# Patient Record
Sex: FEMALE | Race: WHITE
[De-identification: names, ages, dates, MRNs, and addresses within clinical notes are randomized per-mention and may not be internally consistent; named-entity substitution may affect disease eponyms.]

---

## 2017-01-06 ENCOUNTER — APPOINTMENT (OUTPATIENT)
Dept: OBGYN | Facility: CLINIC | Age: 47
End: 2017-01-06

## 2017-01-06 VITALS
WEIGHT: 180 LBS | BODY MASS INDEX: 30.73 KG/M2 | DIASTOLIC BLOOD PRESSURE: 80 MMHG | SYSTOLIC BLOOD PRESSURE: 130 MMHG | HEIGHT: 64 IN

## 2017-01-06 DIAGNOSIS — Z87.42 PERSONAL HISTORY OF OTHER DISEASES OF THE FEMALE GENITAL TRACT: ICD-10-CM

## 2017-01-06 DIAGNOSIS — Z01.419 ENCOUNTER FOR GYNECOLOGICAL EXAMINATION (GENERAL) (ROUTINE) W/OUT ABNORMAL FINDINGS: ICD-10-CM

## 2017-01-06 DIAGNOSIS — F17.200 NICOTINE DEPENDENCE, UNSPECIFIED, UNCOMPLICATED: ICD-10-CM

## 2017-01-09 LAB — HPV HIGH+LOW RISK DNA PNL CVX: NEGATIVE

## 2017-01-10 ENCOUNTER — APPOINTMENT (OUTPATIENT)
Dept: ULTRASOUND IMAGING | Facility: CLINIC | Age: 47
End: 2017-01-10

## 2017-01-10 ENCOUNTER — APPOINTMENT (OUTPATIENT)
Dept: MAMMOGRAPHY | Facility: CLINIC | Age: 47
End: 2017-01-10

## 2017-01-10 ENCOUNTER — OUTPATIENT (OUTPATIENT)
Dept: OUTPATIENT SERVICES | Facility: HOSPITAL | Age: 47
LOS: 1 days | End: 2017-01-10
Payer: COMMERCIAL

## 2017-01-10 DIAGNOSIS — Z00.8 ENCOUNTER FOR OTHER GENERAL EXAMINATION: ICD-10-CM

## 2017-01-10 PROCEDURE — 76830 TRANSVAGINAL US NON-OB: CPT

## 2017-01-10 PROCEDURE — 77067 SCR MAMMO BI INCL CAD: CPT

## 2017-01-10 PROCEDURE — 77063 BREAST TOMOSYNTHESIS BI: CPT

## 2017-01-11 LAB — CYTOLOGY CVX/VAG DOC THIN PREP: NORMAL

## 2017-01-12 DIAGNOSIS — R10.2 PELVIC AND PERINEAL PAIN: ICD-10-CM

## 2017-01-18 ENCOUNTER — FORM ENCOUNTER (OUTPATIENT)
Age: 47
End: 2017-01-18

## 2017-01-18 DIAGNOSIS — R92.8 OTHER ABNORMAL AND INCONCLUSIVE FINDINGS ON DIAGNOSTIC IMAGING OF BREAST: ICD-10-CM

## 2017-01-19 ENCOUNTER — APPOINTMENT (OUTPATIENT)
Dept: MAMMOGRAPHY | Facility: CLINIC | Age: 47
End: 2017-01-19

## 2017-01-19 ENCOUNTER — APPOINTMENT (OUTPATIENT)
Dept: ULTRASOUND IMAGING | Facility: CLINIC | Age: 47
End: 2017-01-19

## 2017-01-19 ENCOUNTER — OUTPATIENT (OUTPATIENT)
Dept: OUTPATIENT SERVICES | Facility: HOSPITAL | Age: 47
LOS: 1 days | End: 2017-01-19
Payer: COMMERCIAL

## 2017-01-19 DIAGNOSIS — Z00.8 ENCOUNTER FOR OTHER GENERAL EXAMINATION: ICD-10-CM

## 2017-01-19 PROCEDURE — 77065 DX MAMMO INCL CAD UNI: CPT

## 2017-01-19 PROCEDURE — G0279: CPT

## 2017-06-08 ENCOUNTER — OTHER (OUTPATIENT)
Age: 47
End: 2017-06-08

## 2018-09-17 ENCOUNTER — EMERGENCY (EMERGENCY)
Facility: HOSPITAL | Age: 48
LOS: 0 days | Discharge: HOME | End: 2018-09-17
Attending: EMERGENCY MEDICINE
Payer: COMMERCIAL

## 2018-09-17 VITALS
RESPIRATION RATE: 17 BRPM | OXYGEN SATURATION: 96 % | SYSTOLIC BLOOD PRESSURE: 96 MMHG | WEIGHT: 169.98 LBS | TEMPERATURE: 98 F | HEART RATE: 66 BPM | DIASTOLIC BLOOD PRESSURE: 63 MMHG | HEIGHT: 65 IN

## 2018-09-17 VITALS
RESPIRATION RATE: 18 BRPM | DIASTOLIC BLOOD PRESSURE: 72 MMHG | HEART RATE: 67 BPM | SYSTOLIC BLOOD PRESSURE: 111 MMHG | OXYGEN SATURATION: 97 % | TEMPERATURE: 98 F

## 2018-09-17 DIAGNOSIS — Z79.84 LONG TERM (CURRENT) USE OF ORAL HYPOGLYCEMIC DRUGS: ICD-10-CM
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
## 2018-09-17 DIAGNOSIS — T65.94XA TOXIC EFFECT OF UNSPECIFIED SUBSTANCE, UNDETERMINED, INITIAL ENCOUNTER: ICD-10-CM

## 2018-09-17 DIAGNOSIS — E03.9 HYPOTHYROIDISM, UNSPECIFIED: ICD-10-CM

## 2018-09-17 DIAGNOSIS — T43.211A POISONING BY SELECTIVE SEROTONIN AND NOREPINEPHRINE REUPTAKE INHIBITORS, ACCIDENTAL (UNINTENTIONAL), INITIAL ENCOUNTER: ICD-10-CM

## 2018-09-17 DIAGNOSIS — E11.9 TYPE 2 DIABETES MELLITUS WITHOUT COMPLICATIONS: ICD-10-CM
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
## 2018-09-17 PROCEDURE — 93010 ELECTROCARDIOGRAM REPORT: CPT

## 2018-09-17 PROCEDURE — 99285 EMERGENCY DEPT VISIT HI MDM: CPT | Mod: 25

## 2018-09-17 NOTE — ED ADULT NURSE NOTE - OBJECTIVE STATEMENT
Pt reports that she accidently took 200 mg of her dog's trazodone instead of her metformin this morning, now feels "dopey," Head is atraumatic. Head shape is symmetrical. Pt reports that she accidently took 200 mg of her dog's trazodone instead of her metformin this morning, now feels "dopey."

## 2018-09-17 NOTE — ED PROVIDER NOTE - PROGRESS NOTE DETAILS
discussed with pcc who states obtain ekg and d/c home as the max daily dose of trazodone is 400-600 mg and pt only took 200 mg. pt tolerated po, will dc

## 2018-09-17 NOTE — ED PROVIDER NOTE - OBJECTIVE STATEMENT
49 yo m with dm, hypothyroidism that accidentally took her dogs 200 mg trazadone instead of her metformin at 7 am.  c/o feeling sleepy. no pain. no other coingestions.

## 2018-09-17 NOTE — ED ADULT TRIAGE NOTE - CHIEF COMPLAINT QUOTE
Patient presents with EMS reports she took 200mg of her dogs Trazadone by accident. Patient lethargic at triage but A&OX3.

## 2018-09-17 NOTE — ED PROVIDER NOTE - MEDICAL DECISION MAKING DETAILS
49 yo f with accidental ingestion of her dogs 200 mg tramadol at 7 am this morning.   ekg, discuss with P[facility]

## 2019-05-20 ENCOUNTER — TRANSCRIPTION ENCOUNTER (OUTPATIENT)
Age: 49
End: 2019-05-20

## 2020-05-19 ENCOUNTER — TRANSCRIPTION ENCOUNTER (OUTPATIENT)
Age: 50
End: 2020-05-19

## 2020-11-14 ENCOUNTER — OUTPATIENT (OUTPATIENT)
Dept: OUTPATIENT SERVICES | Facility: HOSPITAL | Age: 50
LOS: 1 days | End: 2020-11-14
Payer: COMMERCIAL

## 2020-11-14 DIAGNOSIS — Z11.59 ENCOUNTER FOR SCREENING FOR OTHER VIRAL DISEASES: ICD-10-CM

## 2020-11-14 LAB — SARS-COV-2 RNA SPEC QL NAA+PROBE: SIGNIFICANT CHANGE UP

## 2020-11-14 PROCEDURE — U0003: CPT

## 2020-11-15 DIAGNOSIS — Z11.59 ENCOUNTER FOR SCREENING FOR OTHER VIRAL DISEASES: ICD-10-CM

## 2021-01-02 ENCOUNTER — OUTPATIENT (OUTPATIENT)
Dept: OUTPATIENT SERVICES | Facility: HOSPITAL | Age: 51
LOS: 1 days | End: 2021-01-02
Payer: COMMERCIAL

## 2021-01-02 DIAGNOSIS — Z20.828 CONTACT WITH AND (SUSPECTED) EXPOSURE TO OTHER VIRAL COMMUNICABLE DISEASES: ICD-10-CM

## 2021-01-02 PROCEDURE — U0003: CPT

## 2021-01-02 PROCEDURE — C9803: CPT

## 2021-01-02 PROCEDURE — U0005: CPT

## 2021-01-03 ENCOUNTER — TRANSCRIPTION ENCOUNTER (OUTPATIENT)
Age: 51
End: 2021-01-03

## 2021-01-03 DIAGNOSIS — Z20.828 CONTACT WITH AND (SUSPECTED) EXPOSURE TO OTHER VIRAL COMMUNICABLE DISEASES: ICD-10-CM

## 2021-01-03 LAB — SARS-COV-2 RNA SPEC QL NAA+PROBE: SIGNIFICANT CHANGE UP

## 2021-06-24 ENCOUNTER — INPATIENT (INPATIENT)
Facility: HOSPITAL | Age: 51
LOS: 5 days | Discharge: HOME | DRG: 193 | End: 2021-06-30
Attending: FAMILY MEDICINE | Admitting: HOSPITALIST
Payer: COMMERCIAL

## 2021-06-24 VITALS
WEIGHT: 169.09 LBS | HEART RATE: 84 BPM | SYSTOLIC BLOOD PRESSURE: 111 MMHG | TEMPERATURE: 100 F | DIASTOLIC BLOOD PRESSURE: 74 MMHG | RESPIRATION RATE: 20 BRPM | OXYGEN SATURATION: 86 % | HEIGHT: 65 IN

## 2021-06-24 DIAGNOSIS — J18.9 PNEUMONIA, UNSPECIFIED ORGANISM: ICD-10-CM

## 2021-06-24 LAB
ALBUMIN SERPL ELPH-MCNC: 3.3 G/DL — SIGNIFICANT CHANGE UP (ref 3.3–5)
ALP SERPL-CCNC: 79 U/L — SIGNIFICANT CHANGE UP (ref 40–120)
ALT FLD-CCNC: 21 U/L — SIGNIFICANT CHANGE UP (ref 12–78)
ANION GAP SERPL CALC-SCNC: 5 MMOL/L — SIGNIFICANT CHANGE UP (ref 5–17)
AST SERPL-CCNC: 23 U/L — SIGNIFICANT CHANGE UP (ref 15–37)
BASOPHILS # BLD AUTO: 0.03 K/UL — SIGNIFICANT CHANGE UP (ref 0–0.2)
BASOPHILS NFR BLD AUTO: 0.2 % — SIGNIFICANT CHANGE UP (ref 0–2)
BILIRUB SERPL-MCNC: 0.2 MG/DL — SIGNIFICANT CHANGE UP (ref 0.2–1.2)
BUN SERPL-MCNC: 10 MG/DL — SIGNIFICANT CHANGE UP (ref 7–23)
CALCIUM SERPL-MCNC: 9.2 MG/DL — SIGNIFICANT CHANGE UP (ref 8.5–10.1)
CHLORIDE SERPL-SCNC: 104 MMOL/L — SIGNIFICANT CHANGE UP (ref 96–108)
CO2 SERPL-SCNC: 27 MMOL/L — SIGNIFICANT CHANGE UP (ref 22–31)
CREAT SERPL-MCNC: 0.54 MG/DL — SIGNIFICANT CHANGE UP (ref 0.5–1.3)
EOSINOPHIL # BLD AUTO: 0.49 K/UL — SIGNIFICANT CHANGE UP (ref 0–0.5)
EOSINOPHIL NFR BLD AUTO: 3.7 % — SIGNIFICANT CHANGE UP (ref 0–6)
GLUCOSE SERPL-MCNC: 110 MG/DL — HIGH (ref 70–99)
HCG SERPL-ACNC: <1 MIU/ML — SIGNIFICANT CHANGE UP
HCT VFR BLD CALC: 37.9 % — SIGNIFICANT CHANGE UP (ref 34.5–45)
HGB BLD-MCNC: 12.5 G/DL — SIGNIFICANT CHANGE UP (ref 11.5–15.5)
IMM GRANULOCYTES NFR BLD AUTO: 0.4 % — SIGNIFICANT CHANGE UP (ref 0–1.5)
LACTATE SERPL-SCNC: 1.3 MMOL/L — SIGNIFICANT CHANGE UP (ref 0.7–2)
LYMPHOCYTES # BLD AUTO: 1.58 K/UL — SIGNIFICANT CHANGE UP (ref 1–3.3)
LYMPHOCYTES # BLD AUTO: 11.8 % — LOW (ref 13–44)
MCHC RBC-ENTMCNC: 32.7 PG — SIGNIFICANT CHANGE UP (ref 27–34)
MCHC RBC-ENTMCNC: 33 GM/DL — SIGNIFICANT CHANGE UP (ref 32–36)
MCV RBC AUTO: 99.2 FL — SIGNIFICANT CHANGE UP (ref 80–100)
MONOCYTES # BLD AUTO: 0.75 K/UL — SIGNIFICANT CHANGE UP (ref 0–0.9)
MONOCYTES NFR BLD AUTO: 5.6 % — SIGNIFICANT CHANGE UP (ref 2–14)
NEUTROPHILS # BLD AUTO: 10.5 K/UL — HIGH (ref 1.8–7.4)
NEUTROPHILS NFR BLD AUTO: 78.3 % — HIGH (ref 43–77)
PLATELET # BLD AUTO: 291 K/UL — SIGNIFICANT CHANGE UP (ref 150–400)
POTASSIUM SERPL-MCNC: 3.7 MMOL/L — SIGNIFICANT CHANGE UP (ref 3.5–5.3)
POTASSIUM SERPL-SCNC: 3.7 MMOL/L — SIGNIFICANT CHANGE UP (ref 3.5–5.3)
PROT SERPL-MCNC: 7.5 GM/DL — SIGNIFICANT CHANGE UP (ref 6–8.3)
RAPID RVP RESULT: SIGNIFICANT CHANGE UP
RBC # BLD: 3.82 M/UL — SIGNIFICANT CHANGE UP (ref 3.8–5.2)
RBC # FLD: 13 % — SIGNIFICANT CHANGE UP (ref 10.3–14.5)
SARS-COV-2 RNA SPEC QL NAA+PROBE: SIGNIFICANT CHANGE UP
SODIUM SERPL-SCNC: 136 MMOL/L — SIGNIFICANT CHANGE UP (ref 135–145)
WBC # BLD: 13.4 K/UL — HIGH (ref 3.8–10.5)
WBC # FLD AUTO: 13.4 K/UL — HIGH (ref 3.8–10.5)

## 2021-06-24 PROCEDURE — 36415 COLL VENOUS BLD VENIPUNCTURE: CPT

## 2021-06-24 PROCEDURE — 83615 LACTATE (LD) (LDH) ENZYME: CPT

## 2021-06-24 PROCEDURE — 71250 CT THORAX DX C-: CPT

## 2021-06-24 PROCEDURE — 85027 COMPLETE CBC AUTOMATED: CPT

## 2021-06-24 PROCEDURE — 93010 ELECTROCARDIOGRAM REPORT: CPT

## 2021-06-24 PROCEDURE — 86140 C-REACTIVE PROTEIN: CPT

## 2021-06-24 PROCEDURE — 99285 EMERGENCY DEPT VISIT HI MDM: CPT

## 2021-06-24 PROCEDURE — 82784 ASSAY IGA/IGD/IGG/IGM EACH: CPT

## 2021-06-24 PROCEDURE — 80076 HEPATIC FUNCTION PANEL: CPT

## 2021-06-24 PROCEDURE — 71046 X-RAY EXAM CHEST 2 VIEWS: CPT

## 2021-06-24 PROCEDURE — 82306 VITAMIN D 25 HYDROXY: CPT

## 2021-06-24 PROCEDURE — 87070 CULTURE OTHR SPECIMN AEROBIC: CPT

## 2021-06-24 PROCEDURE — 85652 RBC SED RATE AUTOMATED: CPT

## 2021-06-24 PROCEDURE — 83735 ASSAY OF MAGNESIUM: CPT

## 2021-06-24 PROCEDURE — 71045 X-RAY EXAM CHEST 1 VIEW: CPT | Mod: 26

## 2021-06-24 PROCEDURE — 84100 ASSAY OF PHOSPHORUS: CPT

## 2021-06-24 PROCEDURE — 80048 BASIC METABOLIC PNL TOTAL CA: CPT

## 2021-06-24 PROCEDURE — 99222 1ST HOSP IP/OBS MODERATE 55: CPT

## 2021-06-24 PROCEDURE — 85025 COMPLETE CBC W/AUTO DIFF WBC: CPT

## 2021-06-24 PROCEDURE — 84443 ASSAY THYROID STIM HORMONE: CPT

## 2021-06-24 PROCEDURE — 80053 COMPREHEN METABOLIC PANEL: CPT

## 2021-06-24 PROCEDURE — 94640 AIRWAY INHALATION TREATMENT: CPT

## 2021-06-24 PROCEDURE — 94760 N-INVAS EAR/PLS OXIMETRY 1: CPT

## 2021-06-24 PROCEDURE — 86769 SARS-COV-2 COVID-19 ANTIBODY: CPT

## 2021-06-24 RX ORDER — AZITHROMYCIN 500 MG/1
500 TABLET, FILM COATED ORAL ONCE
Refills: 0 | Status: COMPLETED | OUTPATIENT
Start: 2021-06-24 | End: 2021-06-24

## 2021-06-24 RX ORDER — ALPRAZOLAM 0.25 MG
1 TABLET ORAL
Refills: 0 | Status: DISCONTINUED | OUTPATIENT
Start: 2021-06-24 | End: 2021-06-30

## 2021-06-24 RX ORDER — ACETAMINOPHEN 500 MG
650 TABLET ORAL EVERY 6 HOURS
Refills: 0 | Status: DISCONTINUED | OUTPATIENT
Start: 2021-06-24 | End: 2021-06-30

## 2021-06-24 RX ORDER — ALBUTEROL 90 UG/1
2 AEROSOL, METERED ORAL EVERY 6 HOURS
Refills: 0 | Status: DISCONTINUED | OUTPATIENT
Start: 2021-06-24 | End: 2021-06-24

## 2021-06-24 RX ORDER — CEFTRIAXONE 500 MG/1
1000 INJECTION, POWDER, FOR SOLUTION INTRAMUSCULAR; INTRAVENOUS EVERY 24 HOURS
Refills: 0 | Status: DISCONTINUED | OUTPATIENT
Start: 2021-06-24 | End: 2021-06-26

## 2021-06-24 RX ORDER — LOSARTAN POTASSIUM 100 MG/1
50 TABLET, FILM COATED ORAL DAILY
Refills: 0 | Status: DISCONTINUED | OUTPATIENT
Start: 2021-06-24 | End: 2021-06-30

## 2021-06-24 RX ORDER — ALBUTEROL 90 UG/1
6 AEROSOL, METERED ORAL ONCE
Refills: 0 | Status: COMPLETED | OUTPATIENT
Start: 2021-06-24 | End: 2021-06-24

## 2021-06-24 RX ORDER — VENLAFAXINE HCL 75 MG
37.5 CAPSULE, EXT RELEASE 24 HR ORAL DAILY
Refills: 0 | Status: DISCONTINUED | OUTPATIENT
Start: 2021-06-24 | End: 2021-06-25

## 2021-06-24 RX ORDER — SODIUM CHLORIDE 9 MG/ML
500 INJECTION INTRAMUSCULAR; INTRAVENOUS; SUBCUTANEOUS
Refills: 0 | Status: DISCONTINUED | OUTPATIENT
Start: 2021-06-24 | End: 2021-06-30

## 2021-06-24 RX ORDER — ACETAMINOPHEN 500 MG
650 TABLET ORAL ONCE
Refills: 0 | Status: COMPLETED | OUTPATIENT
Start: 2021-06-24 | End: 2021-06-26

## 2021-06-24 RX ORDER — TIOTROPIUM BROMIDE 18 UG/1
1 CAPSULE ORAL; RESPIRATORY (INHALATION) ONCE
Refills: 0 | Status: COMPLETED | OUTPATIENT
Start: 2021-06-24 | End: 2021-06-24

## 2021-06-24 RX ORDER — SODIUM CHLORIDE 9 MG/ML
1000 INJECTION INTRAMUSCULAR; INTRAVENOUS; SUBCUTANEOUS ONCE
Refills: 0 | Status: COMPLETED | OUTPATIENT
Start: 2021-06-24 | End: 2021-06-24

## 2021-06-24 RX ORDER — VENLAFAXINE HCL 75 MG
150 CAPSULE, EXT RELEASE 24 HR ORAL DAILY
Refills: 0 | Status: DISCONTINUED | OUTPATIENT
Start: 2021-06-24 | End: 2021-06-26

## 2021-06-24 RX ORDER — AZITHROMYCIN 500 MG/1
500 TABLET, FILM COATED ORAL EVERY 24 HOURS
Refills: 0 | Status: DISCONTINUED | OUTPATIENT
Start: 2021-06-24 | End: 2021-06-26

## 2021-06-24 RX ORDER — NICOTINE POLACRILEX 2 MG
1 GUM BUCCAL DAILY
Refills: 0 | Status: DISCONTINUED | OUTPATIENT
Start: 2021-06-24 | End: 2021-06-30

## 2021-06-24 RX ORDER — CEFTRIAXONE 500 MG/1
1000 INJECTION, POWDER, FOR SOLUTION INTRAMUSCULAR; INTRAVENOUS ONCE
Refills: 0 | Status: COMPLETED | OUTPATIENT
Start: 2021-06-24 | End: 2021-06-24

## 2021-06-24 RX ORDER — LEVOTHYROXINE SODIUM 125 MCG
112 TABLET ORAL DAILY
Refills: 0 | Status: DISCONTINUED | OUTPATIENT
Start: 2021-06-24 | End: 2021-06-30

## 2021-06-24 RX ORDER — ALBUTEROL 90 UG/1
2 AEROSOL, METERED ORAL EVERY 6 HOURS
Refills: 0 | Status: DISCONTINUED | OUTPATIENT
Start: 2021-06-24 | End: 2021-06-30

## 2021-06-24 RX ORDER — HYDROCHLOROTHIAZIDE 25 MG
12.5 TABLET ORAL DAILY
Refills: 0 | Status: DISCONTINUED | OUTPATIENT
Start: 2021-06-24 | End: 2021-06-30

## 2021-06-24 RX ADMIN — Medication 650 MILLIGRAM(S): at 22:06

## 2021-06-24 RX ADMIN — Medication 650 MILLIGRAM(S): at 21:36

## 2021-06-24 RX ADMIN — AZITHROMYCIN 255 MILLIGRAM(S): 500 TABLET, FILM COATED ORAL at 18:38

## 2021-06-24 RX ADMIN — SODIUM CHLORIDE 125 MILLILITER(S): 9 INJECTION INTRAMUSCULAR; INTRAVENOUS; SUBCUTANEOUS at 21:37

## 2021-06-24 RX ADMIN — Medication 1 MILLIGRAM(S): at 21:36

## 2021-06-24 RX ADMIN — ALBUTEROL 6 PUFF(S): 90 AEROSOL, METERED ORAL at 18:38

## 2021-06-24 RX ADMIN — SODIUM CHLORIDE 1000 MILLILITER(S): 9 INJECTION INTRAMUSCULAR; INTRAVENOUS; SUBCUTANEOUS at 18:39

## 2021-06-24 RX ADMIN — CEFTRIAXONE 1000 MILLIGRAM(S): 500 INJECTION, POWDER, FOR SOLUTION INTRAMUSCULAR; INTRAVENOUS at 18:38

## 2021-06-24 RX ADMIN — TIOTROPIUM BROMIDE 1 CAPSULE(S): 18 CAPSULE ORAL; RESPIRATORY (INHALATION) at 18:13

## 2021-06-24 NOTE — H&P ADULT - HISTORY OF PRESENT ILLNESS
49 y/o F with PMHx of recurrent pna, HTN, anxiety, presents to the ED c/o progressively worsening sob and productive cough w/ dark green sputum x 4 days. Pt also endorses associated HA and low grade fever that are alleviated w/ ASA. Pt reports she vomited throughout Sunday night and has been only eating soup since then. Denies chest discomfort, body aches, loss of smell or taste. Pt is currently a smoker. Oxygen level upon ED arrival was 86% ORA. Pt has had recurrent pna since she was 19, last episode x1.5 years ago. Pt last saw her pulmonologist Dr. Trotter ([place]) x1.5 years ago. No recent travel. Pt reports her son recently had a cold on antibiotics, had a negative COVID test. NKDA. Pt is COVID vaccinated. Pt does not follow w/ a PCP, [facility]. 51 y/o Female with PMHx of recurrent pna, HTN, COPD, anxiety, presents to the ED with complain of progressively worsening sob and productive cough with dark green sputum x 4 days. She denies any fever at home. Denies chest discomfort, body aches, loss of smell or taste.Oxygen level upon ED arrival was 86% ORA. Patient has had recurrent pna since she was 19, last episode x1.5 years ago. Patient last saw her pulmonologist Dr. Trotter ([place]) x1.5 years ago. No recent travel. Patient reports her son recently had a cold on antibiotics, had a negative COVID test. NKDA. Patient is COVID vaccinated. No other complain.

## 2021-06-24 NOTE — ED ADULT NURSE REASSESSMENT NOTE - NS ED NURSE REASSESS COMMENT FT1
pt resting comfortably, vitals stable, in no distress, medications given as ordered, aware of plan of care

## 2021-06-24 NOTE — ED PROVIDER NOTE - MUSCULOSKELETAL, MLM
Spine appears normal, range of motion is not limited, no muscle or joint tenderness. MAEx4, no focal swelling or tenderness.

## 2021-06-24 NOTE — H&P ADULT - ASSESSMENT
A/P:    1.  Community Acquired Pneumonia  -started on IV Ceftriaxone and ZIthromax  -follow cultures  -give Oxygen as needed    2.  h/o COPD  -stable  continue Albuterol inhaler     3.  SCD for DVT ppx    4.  Code status: Patient is in Full code status.     5.  Tobacco abuse  -advised to quit smoking  -on Nicotine patch

## 2021-06-24 NOTE — ED ADULT NURSE NOTE - OBJECTIVE STATEMENT
pt presents to ed ambulatory for evaluation of SOB- pt was seen at urgent care this morning and dxed with PNA and sent home with abx. pt reports feeling worse and coming to ed. , in ed, pt oxygen 88-90% on room air, placed on 2L with good improvement to 94-95%. pt resp in 20s. a&ox4, good color, in no distress. bp normotensive. pt daily smoker. received both covid vaccines. reports sick contact at home

## 2021-06-24 NOTE — ED ADULT NURSE NOTE - CHIEF COMPLAINT QUOTE
pt c/o sobx 4 days, hx of recurrent PNA, pt states her son was sick at home, oxygen 86% RA.  pt currently a smoker, smoked for nearly 40years.  No resp distress noted.  no known hx of COPD.  pt sent to main.  Requested EKG.  pt already received 2 doses of covid vaccine.

## 2021-06-24 NOTE — ED PROVIDER NOTE - OBJECTIVE STATEMENT
51 y/o F with PMHx of recurrent pna, HTN, anxiety, presents to the ED c/o progressively worsening sob and productive cough w/ dark green sputum x 4 days. Pt also endorses associated HA and low grade fever that are alleviated w/ ASA. Pt reports she vomited throughout Sunday night and has been only eating soup since then. Denies chest discomfort, body aches, loss of smell or taste. Pt is currently a smoker. Oxygen level upon ED arrival was 86% ORA. Pt has had recurrent pna since she was 19, last episode x1.5 years ago. Pt last saw her pulmonologist Dr. Trotter ([place]) x1.5 years ago. No recent travel. Pt reports her son recently had a cold on antibiotics, had a negative COVID test. NKDA. Pt is COVID vaccinated. Pt does not follow w/ a PCP, goes to [facility].

## 2021-06-24 NOTE — ED PROVIDER NOTE - CLINICAL SUMMARY MEDICAL DECISION MAKING FREE TEXT BOX
51 y/o F active smoker no previous diagnosis COPD, positive pneumonia pre- COVID, positive COVID vaccinations, ambulatory to ED w/ SOB and cough x4 days, subjective fever, positive hypoxic at Triage, 86 % on room air, positive LLL crackles on exam. Plan: EKG, CXR,  labs including,  blood culture, coags, preg test, COVID swab, O2 supplementations, albuterol, IV fluids, IV antibiotics, admit.

## 2021-06-24 NOTE — ED ADULT TRIAGE NOTE - CHIEF COMPLAINT QUOTE
pt c/o sobx 4 days, hx of recurrent PNA, pt states her son was sick at home, oxygen 86% RA.  pt currently a smoker, smoked for nearly 40years.  No resp distress noted.  no known hx of COPD.  pt sent to main.  Requested EKG pt c/o sobx 4 days, hx of recurrent PNA, pt states her son was sick at home, oxygen 86% RA.  pt currently a smoker, smoked for nearly 40years.  No resp distress noted.  no known hx of COPD.  pt sent to main.  Requested EKG.  pt already received 2 doses of covid vaccine.

## 2021-06-24 NOTE — ED PROVIDER NOTE - ENMT, MLM
Airway patent, Nasal mucosa clear. Mouth with normal mucosa. Throat has no vesicles, no oropharyngeal exudates and uvula is midline.  Oropharynx clear, MM mildly dry.

## 2021-06-24 NOTE — H&P ADULT - NSHPPHYSICALEXAM_GEN_ALL_CORE
Vital Signs Last 24 Hrs  T(C): 37.1 (24 Jun 2021 20:46), Max: 37.8 (24 Jun 2021 16:45)  T(F): 98.7 (24 Jun 2021 20:46), Max: 100.1 (24 Jun 2021 16:45)  HR: 82 (24 Jun 2021 20:46) (82 - 85)  BP: 116/74 (24 Jun 2021 20:46) (111/74 - 120/79)  RR: 18 (24 Jun 2021 20:46) (18 - 22)  SpO2: 96% (24 Jun 2021 20:46) (86% - 96%)

## 2021-06-25 LAB
ALBUMIN SERPL ELPH-MCNC: 2.9 G/DL — LOW (ref 3.3–5)
ALP SERPL-CCNC: 84 U/L — SIGNIFICANT CHANGE UP (ref 40–120)
ALT FLD-CCNC: 20 U/L — SIGNIFICANT CHANGE UP (ref 12–78)
ANION GAP SERPL CALC-SCNC: 3 MMOL/L — LOW (ref 5–17)
ANION GAP SERPL CALC-SCNC: 7 MMOL/L — SIGNIFICANT CHANGE UP (ref 5–17)
AST SERPL-CCNC: 24 U/L — SIGNIFICANT CHANGE UP (ref 15–37)
BILIRUB DIRECT SERPL-MCNC: <0.1 MG/DL — SIGNIFICANT CHANGE UP (ref 0–0.2)
BILIRUB INDIRECT FLD-MCNC: >0.2 MG/DL — SIGNIFICANT CHANGE UP (ref 0.2–1)
BILIRUB SERPL-MCNC: 0.3 MG/DL — SIGNIFICANT CHANGE UP (ref 0.2–1.2)
BUN SERPL-MCNC: 6 MG/DL — LOW (ref 7–23)
BUN SERPL-MCNC: 8 MG/DL — SIGNIFICANT CHANGE UP (ref 7–23)
CALCIUM SERPL-MCNC: 9.2 MG/DL — SIGNIFICANT CHANGE UP (ref 8.5–10.1)
CALCIUM SERPL-MCNC: 9.2 MG/DL — SIGNIFICANT CHANGE UP (ref 8.5–10.1)
CHLORIDE SERPL-SCNC: 105 MMOL/L — SIGNIFICANT CHANGE UP (ref 96–108)
CHLORIDE SERPL-SCNC: 105 MMOL/L — SIGNIFICANT CHANGE UP (ref 96–108)
CO2 SERPL-SCNC: 27 MMOL/L — SIGNIFICANT CHANGE UP (ref 22–31)
CO2 SERPL-SCNC: 30 MMOL/L — SIGNIFICANT CHANGE UP (ref 22–31)
COVID-19 SPIKE DOMAIN AB INTERP: POSITIVE
COVID-19 SPIKE DOMAIN ANTIBODY RESULT: 137 U/ML — HIGH
CREAT SERPL-MCNC: 0.42 MG/DL — LOW (ref 0.5–1.3)
CREAT SERPL-MCNC: 0.44 MG/DL — LOW (ref 0.5–1.3)
GLUCOSE SERPL-MCNC: 104 MG/DL — HIGH (ref 70–99)
GLUCOSE SERPL-MCNC: 107 MG/DL — HIGH (ref 70–99)
HCT VFR BLD CALC: 38 % — SIGNIFICANT CHANGE UP (ref 34.5–45)
HGB BLD-MCNC: 12.4 G/DL — SIGNIFICANT CHANGE UP (ref 11.5–15.5)
MAGNESIUM SERPL-MCNC: 2 MG/DL — SIGNIFICANT CHANGE UP (ref 1.6–2.6)
MCHC RBC-ENTMCNC: 32.6 GM/DL — SIGNIFICANT CHANGE UP (ref 32–36)
MCHC RBC-ENTMCNC: 32.7 PG — SIGNIFICANT CHANGE UP (ref 27–34)
MCV RBC AUTO: 100.3 FL — HIGH (ref 80–100)
PHOSPHATE SERPL-MCNC: 3.3 MG/DL — SIGNIFICANT CHANGE UP (ref 2.5–4.5)
PLATELET # BLD AUTO: 290 K/UL — SIGNIFICANT CHANGE UP (ref 150–400)
POTASSIUM SERPL-MCNC: 3.8 MMOL/L — SIGNIFICANT CHANGE UP (ref 3.5–5.3)
POTASSIUM SERPL-MCNC: 4 MMOL/L — SIGNIFICANT CHANGE UP (ref 3.5–5.3)
POTASSIUM SERPL-SCNC: 3.8 MMOL/L — SIGNIFICANT CHANGE UP (ref 3.5–5.3)
POTASSIUM SERPL-SCNC: 4 MMOL/L — SIGNIFICANT CHANGE UP (ref 3.5–5.3)
PROT SERPL-MCNC: 7.2 GM/DL — SIGNIFICANT CHANGE UP (ref 6–8.3)
RBC # BLD: 3.79 M/UL — LOW (ref 3.8–5.2)
RBC # FLD: 12.9 % — SIGNIFICANT CHANGE UP (ref 10.3–14.5)
SARS-COV-2 IGG+IGM SERPL QL IA: 137 U/ML — HIGH
SARS-COV-2 IGG+IGM SERPL QL IA: POSITIVE
SODIUM SERPL-SCNC: 138 MMOL/L — SIGNIFICANT CHANGE UP (ref 135–145)
SODIUM SERPL-SCNC: 139 MMOL/L — SIGNIFICANT CHANGE UP (ref 135–145)
WBC # BLD: 13.33 K/UL — HIGH (ref 3.8–10.5)
WBC # FLD AUTO: 13.33 K/UL — HIGH (ref 3.8–10.5)

## 2021-06-25 PROCEDURE — 99233 SBSQ HOSP IP/OBS HIGH 50: CPT

## 2021-06-25 RX ORDER — VENLAFAXINE HCL 75 MG
225 CAPSULE, EXT RELEASE 24 HR ORAL DAILY
Refills: 0 | Status: DISCONTINUED | OUTPATIENT
Start: 2021-06-25 | End: 2021-06-30

## 2021-06-25 RX ORDER — BUDESONIDE AND FORMOTEROL FUMARATE DIHYDRATE 160; 4.5 UG/1; UG/1
2 AEROSOL RESPIRATORY (INHALATION)
Refills: 0 | Status: DISCONTINUED | OUTPATIENT
Start: 2021-06-25 | End: 2021-06-30

## 2021-06-25 RX ORDER — METFORMIN HYDROCHLORIDE 850 MG/1
4 TABLET ORAL
Qty: 0 | Refills: 0 | DISCHARGE

## 2021-06-25 RX ORDER — VENLAFAXINE HCL 75 MG
1 CAPSULE, EXT RELEASE 24 HR ORAL
Qty: 0 | Refills: 0 | DISCHARGE

## 2021-06-25 RX ORDER — ALBUTEROL 90 UG/1
2 AEROSOL, METERED ORAL
Qty: 0 | Refills: 0 | DISCHARGE

## 2021-06-25 RX ORDER — LEVOTHYROXINE SODIUM 125 MCG
1 TABLET ORAL
Qty: 0 | Refills: 0 | DISCHARGE

## 2021-06-25 RX ORDER — LOSARTAN POTASSIUM 100 MG/1
1 TABLET, FILM COATED ORAL
Qty: 0 | Refills: 0 | DISCHARGE

## 2021-06-25 RX ORDER — THIAMINE MONONITRATE (VIT B1) 100 MG
100 TABLET ORAL DAILY
Refills: 0 | Status: COMPLETED | OUTPATIENT
Start: 2021-06-25 | End: 2021-06-28

## 2021-06-25 RX ORDER — FLUTICASONE PROPIONATE AND SALMETEROL 50; 250 UG/1; UG/1
1 POWDER ORAL; RESPIRATORY (INHALATION)
Qty: 0 | Refills: 0 | DISCHARGE

## 2021-06-25 RX ORDER — FOLIC ACID 0.8 MG
1 TABLET ORAL DAILY
Refills: 0 | Status: DISCONTINUED | OUTPATIENT
Start: 2021-06-25 | End: 2021-06-29

## 2021-06-25 RX ADMIN — BUDESONIDE AND FORMOTEROL FUMARATE DIHYDRATE 2 PUFF(S): 160; 4.5 AEROSOL RESPIRATORY (INHALATION) at 21:19

## 2021-06-25 RX ADMIN — CEFTRIAXONE 1000 MILLIGRAM(S): 500 INJECTION, POWDER, FOR SOLUTION INTRAMUSCULAR; INTRAVENOUS at 10:11

## 2021-06-25 RX ADMIN — Medication 650 MILLIGRAM(S): at 15:40

## 2021-06-25 RX ADMIN — Medication 650 MILLIGRAM(S): at 22:27

## 2021-06-25 RX ADMIN — Medication 112 MICROGRAM(S): at 06:26

## 2021-06-25 RX ADMIN — AZITHROMYCIN 255 MILLIGRAM(S): 500 TABLET, FILM COATED ORAL at 18:01

## 2021-06-25 RX ADMIN — Medication 225 MILLIGRAM(S): at 11:52

## 2021-06-25 RX ADMIN — Medication 1 MILLIGRAM(S): at 18:02

## 2021-06-25 RX ADMIN — Medication 1 PATCH: at 19:00

## 2021-06-25 RX ADMIN — Medication 650 MILLIGRAM(S): at 08:07

## 2021-06-25 RX ADMIN — Medication 650 MILLIGRAM(S): at 22:57

## 2021-06-25 RX ADMIN — Medication 650 MILLIGRAM(S): at 07:37

## 2021-06-25 RX ADMIN — Medication 100 MILLIGRAM(S): at 18:02

## 2021-06-25 RX ADMIN — Medication 1 PATCH: at 18:01

## 2021-06-25 RX ADMIN — Medication 650 MILLIGRAM(S): at 15:26

## 2021-06-25 NOTE — PROGRESS NOTE ADULT - ASSESSMENT
Community Acquired Pneumonia  -started on IV Ceftriaxone and ZIthromax  -follow cultures  - cont O2 via NC  cont IV abx for the next two days then evaluate for home O2 needs on sunday     Suspected  COPD  her last PFTs were 1.5 yrs ago and pt reports she did not have COPD then   but is on advair at home  current smoker   rec see her pulm on follow-up for repeat PFTs     Tobacco abuse  -advised to quit smoking  -on Nicotine patch     ETOH Abuse  per discussion with patient's , she drinks 1-2 bottles of wine every night  will place on CIWA protocol    Depression  on Venlafaxine       SCD for DVT ppx    Code status: Patient is Full code    discussed with RN/    Community Acquired Pneumonia  -started on IV Ceftriaxone and ZIthromax  -follow cultures  - cont O2 via NC  cont IV abx for the next two days then evaluate for home O2 needs on sunday     Suspected  COPD  her last PFTs were 1.5 yrs ago and pt reports she did not have COPD then   but is on advair at home  current smoker   rec see her pulm on follow-up for repeat PFTs   will consult pulm here  to optimize meds, consider steroids     Tobacco abuse  -advised to quit smoking  -on Nicotine patch     ETOH Abuse  per discussion with patient's , she drinks 1-2 bottles of wine every night  will place on CIWA protocol    Depression  on Venlafaxine   Psychiatry consult given depression and etoh abuse     SCD for DVT ppx    Code status: Patient is Full code    discussed with RN/

## 2021-06-25 NOTE — PROGRESS NOTE ADULT - SUBJECTIVE AND OBJECTIVE BOX
CHIEF COMPLAINT:51 y/o Female with PMHx of recurrent pna, HTN, COPD, anxiety, presents to the ED with complain of progressively worsening sob and productive cough with dark green sputum x 4 days. She denies any fever at home. Denies chest discomfort, body aches, loss of smell or taste.Oxygen level upon ED arrival was 86% ORA. Patient has had recurrent pna since she was 19, last episode x1.5 years ago. Patient last saw her pulmonologist Dr. Trotter ([place]) x1.5 years ago. No recent travel. Patient reports her son recently had a cold on antibiotics, had a negative COVID test. NKDA. Patient is COVID vaccinated. No other complain.    6/25 - no cp palps sob; has been coughing; ambulating, O2 is new for her     PHYSICAL EXAM:  Vital Signs Last 24 Hrs  T(C): 36.9 (25 Jun 2021 16:09), Max: 37.9 (25 Jun 2021 15:24)  T(F): 98.4 (25 Jun 2021 16:09), Max: 100.3 (25 Jun 2021 15:24)  HR: 77 (25 Jun 2021 16:09) (70 - 94)  BP: 109/67 (25 Jun 2021 16:09) (93/54 - 120/79)  RR: 18 (25 Jun 2021 16:09) (16 - 22)  SpO2: 95% (25 Jun 2021 16:09) (93% - 96%)  Constitutional: NAD, awake and alert, well-developed  HEENT: PERR, EOMI  Neck: Soft and supple,  No JVD  Respiratory: few rhonchi; on NC   Cardiovascular: S1 and S2, regular rate and rhythm, no Murmurs  Gastrointestinal: Bowel Sounds present, soft, nontender, nondistended  Extremities: No peripheral edema  Vascular: 2+ peripheral pulses  Neurological: A/O x 3, no focal deficits  Musculoskeletal: 5/5 strength b/l upper and lower extremities  Skin: No rashes    MEDICATIONS:  MEDICATIONS  (STANDING):  ALBUTerol    90 MICROgram(s) HFA Inhaler 2 Puff(s) Inhalation every 6 hours  azithromycin  IVPB 500 milliGRAM(s) IV Intermittent every 24 hours  budesonide 160 MICROgram(s)/formoterol 4.5 MICROgram(s) Inhaler 2 Puff(s) Inhalation two times a day  cefTRIAXone Injectable. 1000 milliGRAM(s) IV Push every 24 hours  folic acid 1 milliGRAM(s) Oral daily  hydrochlorothiazide 12.5 milliGRAM(s) Oral daily  levothyroxine 112 MICROGram(s) Oral daily  losartan 50 milliGRAM(s) Oral daily  nicotine - 21 mG/24Hr(s) Patch 1 patch Transdermal daily  sodium chloride 0.9%. 500 milliLiter(s) (125 mL/Hr) IV Continuous <Continuous>  thiamine 100 milliGRAM(s) Oral daily  venlafaxine XR. 150 milliGRAM(s) Oral daily  venlafaxine XR. 225 milliGRAM(s) Oral daily      LABS: All Labs Reviewed:                      12.4   13.33 )-----------( 290      ( 25 Jun 2021 06:39 )             38.0   139  |  105  |  8   ----------------------------<  107<H>  3.8   |  27  |  0.42<L>  Ca    9.2      25 Jun 2021 15:56  Phos  3.3     06-25  Mg     2.0     06-25  TPro 7.2  /  Alb  2.9<L>  /  TBili  0.3  /  DBili  <0.1  /  AST  24  /  ALT  20  /  AlkPhos  84  06-25    RADIOLOGY/EKG:  < from: Xray Chest 1 View- PORTABLE-Urgent (06.24.21 @ 18:18) >  Patchy infiltrates. Differential diagnosis includes Covid-19 pneumonia among other possibilities.

## 2021-06-26 LAB
ANION GAP SERPL CALC-SCNC: 4 MMOL/L — LOW (ref 5–17)
BUN SERPL-MCNC: 10 MG/DL — SIGNIFICANT CHANGE UP (ref 7–23)
CALCIUM SERPL-MCNC: 9 MG/DL — SIGNIFICANT CHANGE UP (ref 8.5–10.1)
CHLORIDE SERPL-SCNC: 105 MMOL/L — SIGNIFICANT CHANGE UP (ref 96–108)
CO2 SERPL-SCNC: 28 MMOL/L — SIGNIFICANT CHANGE UP (ref 22–31)
CREAT SERPL-MCNC: 0.35 MG/DL — LOW (ref 0.5–1.3)
GLUCOSE SERPL-MCNC: 91 MG/DL — SIGNIFICANT CHANGE UP (ref 70–99)
HCT VFR BLD CALC: 35.1 % — SIGNIFICANT CHANGE UP (ref 34.5–45)
HGB BLD-MCNC: 11.2 G/DL — LOW (ref 11.5–15.5)
MAGNESIUM SERPL-MCNC: 2.1 MG/DL — SIGNIFICANT CHANGE UP (ref 1.6–2.6)
MCHC RBC-ENTMCNC: 31.9 GM/DL — LOW (ref 32–36)
MCHC RBC-ENTMCNC: 32 PG — SIGNIFICANT CHANGE UP (ref 27–34)
MCV RBC AUTO: 100.3 FL — HIGH (ref 80–100)
PHOSPHATE SERPL-MCNC: 3.6 MG/DL — SIGNIFICANT CHANGE UP (ref 2.5–4.5)
PLATELET # BLD AUTO: 313 K/UL — SIGNIFICANT CHANGE UP (ref 150–400)
POTASSIUM SERPL-MCNC: 3.8 MMOL/L — SIGNIFICANT CHANGE UP (ref 3.5–5.3)
POTASSIUM SERPL-SCNC: 3.8 MMOL/L — SIGNIFICANT CHANGE UP (ref 3.5–5.3)
RBC # BLD: 3.5 M/UL — LOW (ref 3.8–5.2)
RBC # FLD: 12.6 % — SIGNIFICANT CHANGE UP (ref 10.3–14.5)
SODIUM SERPL-SCNC: 137 MMOL/L — SIGNIFICANT CHANGE UP (ref 135–145)
WBC # BLD: 12.54 K/UL — HIGH (ref 3.8–10.5)
WBC # FLD AUTO: 12.54 K/UL — HIGH (ref 3.8–10.5)

## 2021-06-26 PROCEDURE — 71250 CT THORAX DX C-: CPT | Mod: 26

## 2021-06-26 PROCEDURE — 99233 SBSQ HOSP IP/OBS HIGH 50: CPT

## 2021-06-26 RX ORDER — HEPARIN SODIUM 5000 [USP'U]/ML
5000 INJECTION INTRAVENOUS; SUBCUTANEOUS EVERY 8 HOURS
Refills: 0 | Status: DISCONTINUED | OUTPATIENT
Start: 2021-06-26 | End: 2021-06-30

## 2021-06-26 RX ORDER — PIPERACILLIN AND TAZOBACTAM 4; .5 G/20ML; G/20ML
3.38 INJECTION, POWDER, LYOPHILIZED, FOR SOLUTION INTRAVENOUS ONCE
Refills: 0 | Status: COMPLETED | OUTPATIENT
Start: 2021-06-26 | End: 2021-06-26

## 2021-06-26 RX ORDER — PIPERACILLIN AND TAZOBACTAM 4; .5 G/20ML; G/20ML
3.38 INJECTION, POWDER, LYOPHILIZED, FOR SOLUTION INTRAVENOUS EVERY 8 HOURS
Refills: 0 | Status: DISCONTINUED | OUTPATIENT
Start: 2021-06-26 | End: 2021-06-30

## 2021-06-26 RX ORDER — AZITHROMYCIN 500 MG/1
250 TABLET, FILM COATED ORAL DAILY
Refills: 0 | Status: COMPLETED | OUTPATIENT
Start: 2021-06-26 | End: 2021-06-29

## 2021-06-26 RX ADMIN — PIPERACILLIN AND TAZOBACTAM 200 GRAM(S): 4; .5 INJECTION, POWDER, LYOPHILIZED, FOR SOLUTION INTRAVENOUS at 13:41

## 2021-06-26 RX ADMIN — Medication 112 MICROGRAM(S): at 07:23

## 2021-06-26 RX ADMIN — Medication 1 MILLIGRAM(S): at 10:48

## 2021-06-26 RX ADMIN — Medication 650 MILLIGRAM(S): at 22:16

## 2021-06-26 RX ADMIN — Medication 100 MILLIGRAM(S): at 10:10

## 2021-06-26 RX ADMIN — Medication 1 MILLIGRAM(S): at 10:10

## 2021-06-26 RX ADMIN — HEPARIN SODIUM 5000 UNIT(S): 5000 INJECTION INTRAVENOUS; SUBCUTANEOUS at 22:01

## 2021-06-26 RX ADMIN — BUDESONIDE AND FORMOTEROL FUMARATE DIHYDRATE 2 PUFF(S): 160; 4.5 AEROSOL RESPIRATORY (INHALATION) at 20:25

## 2021-06-26 RX ADMIN — BUDESONIDE AND FORMOTEROL FUMARATE DIHYDRATE 2 PUFF(S): 160; 4.5 AEROSOL RESPIRATORY (INHALATION) at 08:56

## 2021-06-26 RX ADMIN — Medication 225 MILLIGRAM(S): at 12:37

## 2021-06-26 RX ADMIN — PIPERACILLIN AND TAZOBACTAM 25 GRAM(S): 4; .5 INJECTION, POWDER, LYOPHILIZED, FOR SOLUTION INTRAVENOUS at 22:01

## 2021-06-26 RX ADMIN — Medication 1 MILLIGRAM(S): at 01:20

## 2021-06-26 RX ADMIN — AZITHROMYCIN 250 MILLIGRAM(S): 500 TABLET, FILM COATED ORAL at 18:42

## 2021-06-26 RX ADMIN — PIPERACILLIN AND TAZOBACTAM 25 GRAM(S): 4; .5 INJECTION, POWDER, LYOPHILIZED, FOR SOLUTION INTRAVENOUS at 14:51

## 2021-06-26 RX ADMIN — Medication 650 MILLIGRAM(S): at 15:41

## 2021-06-26 RX ADMIN — CEFTRIAXONE 1000 MILLIGRAM(S): 500 INJECTION, POWDER, FOR SOLUTION INTRAMUSCULAR; INTRAVENOUS at 10:15

## 2021-06-26 NOTE — BH CONSULTATION LIAISON ASSESSMENT NOTE - NSBHCONSULTRECOMMENDOTHER_PSY_A_CORE FT
1. Given patient's history of daily alcohol use, she will benefit from being weaned of the xanax. Risk for accidental overdose remains elevated if patient continues to drink daily.   2. Dr. Sudhir Graham paged to be made aware that patient's Effexor should not be more than 225mg daily. Hospital pharmacist ([place]) also made aware to cancel that order.  3. Case also discussed with my supervisor (Dr. perkins).

## 2021-06-26 NOTE — PROGRESS NOTE ADULT - SUBJECTIVE AND OBJECTIVE BOX
CHIEF COMPLAINT:51 y/o Female with PMHx of recurrent pna, HTN, COPD, anxiety, presents to the ED with complain of progressively worsening sob and productive cough with dark green sputum x 4 days. She denies any fever at home. Denies chest discomfort, body aches, loss of smell or taste.Oxygen level upon ED arrival was 86% ORA. Patient has had recurrent pna since she was 19, last episode x1.5 years ago. Patient last saw her pulmonologist Dr. Trotter ([place]) x1.5 years ago. No recent travel. Patient reports her son recently had a cold on antibiotics, had a negative COVID test. NKDA. Patient is COVID vaccinated. No other complain.    6/25 - no cp palps sob; has been coughing; ambulating, O2 is new for her   6/26 - no cp palps sob; cough improving     PHYSICAL EXAM:  Vital Signs Last 24 Hrs  T(C): 37.6 (26 Jun 2021 08:30), Max: 37.6 (25 Jun 2021 22:19)  T(F): 99.6 (26 Jun 2021 08:30), Max: 99.6 (25 Jun 2021 22:19)  HR: 74 (26 Jun 2021 08:55) (70 - 84)  BP: 89/48 (26 Jun 2021 08:30) (89/48 - 112/60)  RR: 20 (26 Jun 2021 08:30) (18 - 24)  SpO2: 90% (26 Jun 2021 08:55) (90% - 95%)  Constitutional: NAD, awake and alert, well-developed  HEENT: PERR, EOMI  Neck: Soft and supple,  No JVD  Respiratory: few rhonchi; on NC   Cardiovascular: S1 and S2, regular rate and rhythm, no Murmurs  Gastrointestinal: Bowel Sounds present, soft, nontender, nondistended  Extremities: No peripheral edema  Vascular: 2+ peripheral pulses  Neurological: A/O x 3, no focal deficits  Musculoskeletal: 5/5 strength b/l upper and lower extremities  Skin: No rashes    RADIOLOGY/EKG:  < from: Xray Chest 1 View- PORTABLE-Urgent (06.24.21 @ 18:18) >  Patchy infiltrates. Differential diagnosis includes Covid-19 pneumonia among other possibilities.    LABS: All Labs Reviewed:                        11.2   12.54 )-----------( 313      ( 26 Jun 2021 05:59 )             35.1     137  |  105  |  10  ----------------------------<  91  3.8   |  28  |  0.35<L>  Ca    9.0      26 Jun 2021 05:59  Phos  3.6     06-26  Mg     2.1     06-26  TPro  7.2  /  Alb  2.9<L>  /  TBili  0.3  /  DBili  <0.1  /  AST  24  /  ALT  20  /  AlkPhos  84  06-25      MEDS:   acetaminophen   Tablet .. 650 milliGRAM(s) Oral every 6 hours PRN  ALBUTerol    90 MICROgram(s) HFA Inhaler 2 Puff(s) Inhalation every 6 hours  ALPRAZolam 1 milliGRAM(s) Oral four times a day PRN  budesonide 160 MICROgram(s)/formoterol 4.5 MICROgram(s) Inhaler 2 Puff(s) Inhalation two times a day  folic acid 1 milliGRAM(s) Oral daily  heparin   Injectable 5000 Unit(s) SubCutaneous every 8 hours  hydrochlorothiazide 12.5 milliGRAM(s) Oral daily  levothyroxine 112 MICROGram(s) Oral daily  LORazepam     Tablet 2 milliGRAM(s) Oral every 2 hours PRN  LORazepam   Injectable 2 milliGRAM(s) IV Push every 1 hour PRN  LORazepam   Injectable 2 milliGRAM(s) IV Push every 2 hours PRN  LORazepam   Injectable 2 milliGRAM(s) IV Push every 1 hour PRN  losartan 50 milliGRAM(s) Oral daily  nicotine - 21 mG/24Hr(s) Patch 1 patch Transdermal daily  piperacillin/tazobactam IVPB.. 3.375 Gram(s) IV Intermittent every 8 hours  sodium chloride 0.9%. 500 milliLiter(s) IV Continuous <Continuous>  thiamine 100 milliGRAM(s) Oral daily  venlafaxine XR. 225 milliGRAM(s) Oral daily

## 2021-06-26 NOTE — BH CONSULTATION LIAISON ASSESSMENT NOTE - CURRENT MEDICATION
MEDICATIONS  (STANDING):  ALBUTerol    90 MICROgram(s) HFA Inhaler 2 Puff(s) Inhalation every 6 hours  budesonide 160 MICROgram(s)/formoterol 4.5 MICROgram(s) Inhaler 2 Puff(s) Inhalation two times a day  folic acid 1 milliGRAM(s) Oral daily  hydrochlorothiazide 12.5 milliGRAM(s) Oral daily  levothyroxine 112 MICROGram(s) Oral daily  losartan 50 milliGRAM(s) Oral daily  nicotine - 21 mG/24Hr(s) Patch 1 patch Transdermal daily  piperacillin/tazobactam IVPB. 3.375 Gram(s) IV Intermittent once  piperacillin/tazobactam IVPB.. 3.375 Gram(s) IV Intermittent every 8 hours  sodium chloride 0.9%. 500 milliLiter(s) (125 mL/Hr) IV Continuous <Continuous>  thiamine 100 milliGRAM(s) Oral daily  venlafaxine XR. 225 milliGRAM(s) Oral daily    MEDICATIONS  (PRN):  acetaminophen   Tablet .. 650 milliGRAM(s) Oral once PRN Temp greater or equal to 38C (100.4F), Mild Pain (1 - 3)  acetaminophen   Tablet .. 650 milliGRAM(s) Oral every 6 hours PRN Temp greater or equal to 38C (100.4F), Mild Pain (1 - 3)  ALPRAZolam 1 milliGRAM(s) Oral four times a day PRN for anxiety, for insomnia  LORazepam     Tablet 2 milliGRAM(s) Oral every 2 hours PRN Symptom-triggered 2 point increase in CIWA-Ar  LORazepam   Injectable 2 milliGRAM(s) IV Push every 1 hour PRN Symptom-triggered: each CIWA -Ar score 8 or GREATER  LORazepam   Injectable 2 milliGRAM(s) IV Push every 2 hours PRN CIWA-Ar score increase by 2 points and a total score of 7 or less  LORazepam   Injectable 2 milliGRAM(s) IV Push every 1 hour PRN CIWA-Ar score 8 or greater

## 2021-06-26 NOTE — PROGRESS NOTE ADULT - ASSESSMENT
Community Acquired Pneumonia  -started on IV Ceftriaxone and ZIthromax  6/26 - wbc the same - change to zosyn in light of recurrent pna and chr etoh use   will get CT Chest as O2 use is new , discussed with Pulm   cont IV abx for the next two days then evaluate for home O2 needs on sunday     Suspected  COPD  her last PFTs were 1.5 yrs ago and pt reports she did not have COPD then   but is on advair at home  current smoker   rec see her pulm on follow-up for repeat PFTs   will consult pulm here  to optimize meds, consider steroids     Tobacco abuse  -advised to quit smoking  -on Nicotine patch     ETOH Abuse  per discussion with patient's , she drinks 1-2 bottles of wine every night  will place on CIWA protocol  6/26 - scoring low on CIWA at this time     Depression  on Venlafaxine   Psychiatry consult given depression and etoh abuse \  6/26 - rec taper xanax as OP; pt to speak to her psychiatrist about etoh use   pt seen by behavioral health team here     VTE Px - SCDs, HEP SC     Code status: Patient is Full code    6/25 - discussed with RN/   6/26 - discussed with RN and PUlm

## 2021-06-26 NOTE — BH CONSULTATION LIAISON ASSESSMENT NOTE - NSBHCHARTREVIEWVS_PSY_A_CORE FT
Vital Signs Last 24 Hrs  T(C): 37.6 (26 Jun 2021 08:30), Max: 37.9 (25 Jun 2021 15:24)  T(F): 99.6 (26 Jun 2021 08:30), Max: 100.3 (25 Jun 2021 15:24)  HR: 74 (26 Jun 2021 08:55) (70 - 84)  BP: 89/48 (26 Jun 2021 08:30) (89/48 - 112/60)  BP(mean): --  RR: 20 (26 Jun 2021 08:30) (18 - 24)  SpO2: 90% (26 Jun 2021 08:55) (90% - 95%)

## 2021-06-26 NOTE — BH CONSULTATION LIAISON ASSESSMENT NOTE - NSBHCONSULTPRIMARYDISCUSSNO_PSY_A_CORE FT
Dr. Sudhir Graham was paged overhead to discuss the case with him; however, he did not call back. The case was discussed with Dr. Tsai.
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
## 2021-06-26 NOTE — BH CONSULTATION LIAISON ASSESSMENT NOTE - NSUNABLEASSESSPROTRISKCOMMENT_PSY_ALL_CORE
Patient states her  is supportive and that she has a good relationship with her children. She denies current intent or plans to die.

## 2021-06-26 NOTE — BH CONSULTATION LIAISON ASSESSMENT NOTE - ADDITIONAL DETAILS ALL
left abnormal...
Patient reports history of passive suicidal thoughts, but denies history of suicidal attempts. Denies history of recent suicidal thoughts.
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
## 2021-06-27 LAB
ANION GAP SERPL CALC-SCNC: 3 MMOL/L — LOW (ref 5–17)
BASOPHILS # BLD AUTO: 0.05 K/UL — SIGNIFICANT CHANGE UP (ref 0–0.2)
BASOPHILS NFR BLD AUTO: 0.5 % — SIGNIFICANT CHANGE UP (ref 0–2)
BUN SERPL-MCNC: 11 MG/DL — SIGNIFICANT CHANGE UP (ref 7–23)
CALCIUM SERPL-MCNC: 8.7 MG/DL — SIGNIFICANT CHANGE UP (ref 8.5–10.1)
CHLORIDE SERPL-SCNC: 103 MMOL/L — SIGNIFICANT CHANGE UP (ref 96–108)
CO2 SERPL-SCNC: 29 MMOL/L — SIGNIFICANT CHANGE UP (ref 22–31)
CREAT SERPL-MCNC: 0.28 MG/DL — LOW (ref 0.5–1.3)
EOSINOPHIL # BLD AUTO: 0.59 K/UL — HIGH (ref 0–0.5)
EOSINOPHIL NFR BLD AUTO: 5.8 % — SIGNIFICANT CHANGE UP (ref 0–6)
GLUCOSE SERPL-MCNC: 105 MG/DL — HIGH (ref 70–99)
HCT VFR BLD CALC: 33 % — LOW (ref 34.5–45)
HGB BLD-MCNC: 10.8 G/DL — LOW (ref 11.5–15.5)
IMM GRANULOCYTES NFR BLD AUTO: 0.4 % — SIGNIFICANT CHANGE UP (ref 0–1.5)
LYMPHOCYTES # BLD AUTO: 1.41 K/UL — SIGNIFICANT CHANGE UP (ref 1–3.3)
LYMPHOCYTES # BLD AUTO: 13.9 % — SIGNIFICANT CHANGE UP (ref 13–44)
MAGNESIUM SERPL-MCNC: 2.3 MG/DL — SIGNIFICANT CHANGE UP (ref 1.6–2.6)
MCHC RBC-ENTMCNC: 32.5 PG — SIGNIFICANT CHANGE UP (ref 27–34)
MCHC RBC-ENTMCNC: 32.7 GM/DL — SIGNIFICANT CHANGE UP (ref 32–36)
MCV RBC AUTO: 99.4 FL — SIGNIFICANT CHANGE UP (ref 80–100)
MONOCYTES # BLD AUTO: 0.91 K/UL — HIGH (ref 0–0.9)
MONOCYTES NFR BLD AUTO: 9 % — SIGNIFICANT CHANGE UP (ref 2–14)
NEUTROPHILS # BLD AUTO: 7.13 K/UL — SIGNIFICANT CHANGE UP (ref 1.8–7.4)
NEUTROPHILS NFR BLD AUTO: 70.4 % — SIGNIFICANT CHANGE UP (ref 43–77)
PLATELET # BLD AUTO: 318 K/UL — SIGNIFICANT CHANGE UP (ref 150–400)
POTASSIUM SERPL-MCNC: 3.7 MMOL/L — SIGNIFICANT CHANGE UP (ref 3.5–5.3)
POTASSIUM SERPL-SCNC: 3.7 MMOL/L — SIGNIFICANT CHANGE UP (ref 3.5–5.3)
RBC # BLD: 3.32 M/UL — LOW (ref 3.8–5.2)
RBC # FLD: 12.4 % — SIGNIFICANT CHANGE UP (ref 10.3–14.5)
SODIUM SERPL-SCNC: 135 MMOL/L — SIGNIFICANT CHANGE UP (ref 135–145)
WBC # BLD: 10.13 K/UL — SIGNIFICANT CHANGE UP (ref 3.8–10.5)
WBC # FLD AUTO: 10.13 K/UL — SIGNIFICANT CHANGE UP (ref 3.8–10.5)

## 2021-06-27 PROCEDURE — 99233 SBSQ HOSP IP/OBS HIGH 50: CPT

## 2021-06-27 RX ADMIN — Medication 1 MILLIGRAM(S): at 10:28

## 2021-06-27 RX ADMIN — Medication 650 MILLIGRAM(S): at 16:34

## 2021-06-27 RX ADMIN — BUDESONIDE AND FORMOTEROL FUMARATE DIHYDRATE 2 PUFF(S): 160; 4.5 AEROSOL RESPIRATORY (INHALATION) at 09:46

## 2021-06-27 RX ADMIN — Medication 225 MILLIGRAM(S): at 10:28

## 2021-06-27 RX ADMIN — PIPERACILLIN AND TAZOBACTAM 25 GRAM(S): 4; .5 INJECTION, POWDER, LYOPHILIZED, FOR SOLUTION INTRAVENOUS at 13:38

## 2021-06-27 RX ADMIN — HEPARIN SODIUM 5000 UNIT(S): 5000 INJECTION INTRAVENOUS; SUBCUTANEOUS at 22:00

## 2021-06-27 RX ADMIN — Medication 1 MILLIGRAM(S): at 11:11

## 2021-06-27 RX ADMIN — AZITHROMYCIN 250 MILLIGRAM(S): 500 TABLET, FILM COATED ORAL at 10:27

## 2021-06-27 RX ADMIN — HEPARIN SODIUM 5000 UNIT(S): 5000 INJECTION INTRAVENOUS; SUBCUTANEOUS at 13:38

## 2021-06-27 RX ADMIN — PIPERACILLIN AND TAZOBACTAM 25 GRAM(S): 4; .5 INJECTION, POWDER, LYOPHILIZED, FOR SOLUTION INTRAVENOUS at 22:01

## 2021-06-27 RX ADMIN — Medication 100 MILLIGRAM(S): at 10:28

## 2021-06-27 RX ADMIN — Medication 650 MILLIGRAM(S): at 05:22

## 2021-06-27 RX ADMIN — Medication 112 MICROGRAM(S): at 05:22

## 2021-06-27 RX ADMIN — HEPARIN SODIUM 5000 UNIT(S): 5000 INJECTION INTRAVENOUS; SUBCUTANEOUS at 05:22

## 2021-06-27 RX ADMIN — PIPERACILLIN AND TAZOBACTAM 25 GRAM(S): 4; .5 INJECTION, POWDER, LYOPHILIZED, FOR SOLUTION INTRAVENOUS at 05:22

## 2021-06-27 RX ADMIN — Medication 40 MILLIGRAM(S): at 22:01

## 2021-06-27 NOTE — PROGRESS NOTE ADULT - SUBJECTIVE AND OBJECTIVE BOX
CHIEF COMPLAINT:49 y/o Female with PMHx of recurrent pna, HTN, COPD, anxiety, presents to the ED with complain of progressively worsening sob and productive cough with dark green sputum x 4 days. She denies any fever at home. Denies chest discomfort, body aches, loss of smell or taste.Oxygen level upon ED arrival was 86% ORA. Patient has had recurrent pna since she was 19, last episode x1.5 years ago. Patient last saw her pulmonologist Dr. Trotter ([place]) x1.5 years ago. No recent travel. Patient reports her son recently had a cold on antibiotics, had a negative COVID test. NKDA. Patient is COVID vaccinated. No other complain.    6/25 - no cp palps sob; has been coughing; ambulating, O2 is new for her   6/26 - no cp palps sob; cough improving     PHYSICAL EXAM:  Vital Signs Last 24 Hrs  T(C): 36.9 (27 Jun 2021 04:58), Max: 38 (26 Jun 2021 16:27)  T(F): 98.4 (27 Jun 2021 04:58), Max: 100.4 (26 Jun 2021 16:27)  HR: 71 (27 Jun 2021 04:58) (67 - 88)  BP: 105/65 (27 Jun 2021 04:58) (94/53 - 105/65)  RR: 18 (27 Jun 2021 04:58) (18 - 19)  SpO2: 97% (27 Jun 2021 04:58) (90% - 97%)  Constitutional: NAD, awake and alert, well-developed  HEENT: PERR, EOMI  Neck: Soft and supple,  No JVD  Respiratory: few rhonchi; on NC   Cardiovascular: S1 and S2, regular rate and rhythm, no Murmurs  Gastrointestinal: Bowel Sounds present, soft, nontender, nondistended  Extremities: No peripheral edema  Vascular: 2+ peripheral pulses  Neurological: A/O x 3, no focal deficits  Musculoskeletal: 5/5 strength b/l upper and lower extremities  Skin: No rashes    RADIOLOGY/EKG:  < from: Xray Chest 1 View- PORTABLE-Urgent (06.24.21 @ 18:18) >  Patchy infiltrates. Differential diagnosis includes Covid-19 pneumonia among other possibilities.    < from: CT Chest No Cont (06.26.21 @ 18:08) >  IMPRESSION:  Diffuse bilateral pneumonia and lymphadenopathy.        LABS: All Labs Reviewed:                     10.8   10.13 )-----------( 318      ( 27 Jun 2021 06:24 )             33.0   135  |  103  |  11  ----------------------------<  105<H>  3.7   |  29  |  0.28<L>  Ca    8.7      27 Jun 2021 06:24  Phos  3.6     06-26  Mg     2.3     06-27  TPro  7.2  /  Alb  2.9<L>  /  TBili  0.3  /  DBili  <0.1  /  AST  24  /  ALT  20  /  AlkPhos  84  06-25        MEDS:   acetaminophen   Tablet .. 650 milliGRAM(s) Oral every 6 hours PRN  ALBUTerol    90 MICROgram(s) HFA Inhaler 2 Puff(s) Inhalation every 6 hours  ALPRAZolam 1 milliGRAM(s) Oral four times a day PRN  azithromycin   Tablet 250 milliGRAM(s) Oral daily  budesonide 160 MICROgram(s)/formoterol 4.5 MICROgram(s) Inhaler 2 Puff(s) Inhalation two times a day  folic acid 1 milliGRAM(s) Oral daily  heparin   Injectable 5000 Unit(s) SubCutaneous every 8 hours  hydrochlorothiazide 12.5 milliGRAM(s) Oral daily  levothyroxine 112 MICROGram(s) Oral daily  LORazepam     Tablet 2 milliGRAM(s) Oral every 2 hours PRN  LORazepam   Injectable 2 milliGRAM(s) IV Push every 1 hour PRN  LORazepam   Injectable 2 milliGRAM(s) IV Push every 2 hours PRN  LORazepam   Injectable 2 milliGRAM(s) IV Push every 1 hour PRN  losartan 50 milliGRAM(s) Oral daily  nicotine - 21 mG/24Hr(s) Patch 1 patch Transdermal daily  piperacillin/tazobactam IVPB.. 3.375 Gram(s) IV Intermittent every 8 hours  sodium chloride 0.9%. 500 milliLiter(s) IV Continuous <Continuous>  thiamine 100 milliGRAM(s) Oral daily  venlafaxine XR. 225 milliGRAM(s) Oral daily                   CHIEF COMPLAINT:49 y/o Female with PMHx of recurrent pna, HTN, COPD, anxiety, presents to the ED with complain of progressively worsening sob and productive cough with dark green sputum x 4 days. She denies any fever at home. Denies chest discomfort, body aches, loss of smell or taste.Oxygen level upon ED arrival was 86% ORA. Patient has had recurrent pna since she was 19, last episode x1.5 years ago. Patient last saw her pulmonologist Dr. Trotter ([place]) x1.5 years ago. No recent travel. Patient reports her son recently had a cold on antibiotics, had a negative COVID test. NKDA. Patient is COVID vaccinated. No other complain.    6/25 - no cp palps sob; has been coughing; ambulating, O2 is new for her   6/26 - no cp palps sob; cough improving   6/27 - no cp palps; o2 sats dropped on ambulation, no other complaints     PHYSICAL EXAM:  Vital Signs Last 24 Hrs  T(C): 36.9 (27 Jun 2021 04:58), Max: 38 (26 Jun 2021 16:27)  T(F): 98.4 (27 Jun 2021 04:58), Max: 100.4 (26 Jun 2021 16:27)  HR: 71 (27 Jun 2021 04:58) (67 - 88)  BP: 105/65 (27 Jun 2021 04:58) (94/53 - 105/65)  RR: 18 (27 Jun 2021 04:58) (18 - 19)  SpO2: 97% (27 Jun 2021 04:58) (90% - 97%)  Constitutional: NAD, awake and alert, well-developed  HEENT: PERR, EOMI  Neck: Soft and supple,  No JVD  Respiratory: few rhonchi; on NC   Cardiovascular: S1 and S2, regular rate and rhythm, no Murmurs  Gastrointestinal: Bowel Sounds present, soft, nontender, nondistended  Extremities: No peripheral edema  Vascular: 2+ peripheral pulses  Neurological: A/O x 3, no focal deficits  Musculoskeletal: 5/5 strength b/l upper and lower extremities  Skin: No rashes    RADIOLOGY/EKG:  < from: Xray Chest 1 View- PORTABLE-Urgent (06.24.21 @ 18:18) >  Patchy infiltrates. Differential diagnosis includes Covid-19 pneumonia among other possibilities.    < from: CT Chest No Cont (06.26.21 @ 18:08) >  IMPRESSION:  Diffuse bilateral pneumonia and lymphadenopathy.        LABS: All Labs Reviewed:                     10.8   10.13 )-----------( 318      ( 27 Jun 2021 06:24 )             33.0   135  |  103  |  11  ----------------------------<  105<H>  3.7   |  29  |  0.28<L>  Ca    8.7      27 Jun 2021 06:24  Phos  3.6     06-26  Mg     2.3     06-27  TPro  7.2  /  Alb  2.9<L>  /  TBili  0.3  /  DBili  <0.1  /  AST  24  /  ALT  20  /  AlkPhos  84  06-25        MEDS:   acetaminophen   Tablet .. 650 milliGRAM(s) Oral every 6 hours PRN  ALBUTerol    90 MICROgram(s) HFA Inhaler 2 Puff(s) Inhalation every 6 hours  ALPRAZolam 1 milliGRAM(s) Oral four times a day PRN  azithromycin   Tablet 250 milliGRAM(s) Oral daily  budesonide 160 MICROgram(s)/formoterol 4.5 MICROgram(s) Inhaler 2 Puff(s) Inhalation two times a day  folic acid 1 milliGRAM(s) Oral daily  heparin   Injectable 5000 Unit(s) SubCutaneous every 8 hours  hydrochlorothiazide 12.5 milliGRAM(s) Oral daily  levothyroxine 112 MICROGram(s) Oral daily  LORazepam     Tablet 2 milliGRAM(s) Oral every 2 hours PRN  LORazepam   Injectable 2 milliGRAM(s) IV Push every 1 hour PRN  LORazepam   Injectable 2 milliGRAM(s) IV Push every 2 hours PRN  LORazepam   Injectable 2 milliGRAM(s) IV Push every 1 hour PRN  losartan 50 milliGRAM(s) Oral daily  nicotine - 21 mG/24Hr(s) Patch 1 patch Transdermal daily  piperacillin/tazobactam IVPB.. 3.375 Gram(s) IV Intermittent every 8 hours  sodium chloride 0.9%. 500 milliLiter(s) IV Continuous <Continuous>  thiamine 100 milliGRAM(s) Oral daily  venlafaxine XR. 225 milliGRAM(s) Oral daily

## 2021-06-27 NOTE — CONSULT NOTE ADULT - SUBJECTIVE AND OBJECTIVE BOX
Patient is a 50y old  Female who presents with a chief complaint of Shortness of breath and cough (27 Jun 2021 08:37)    HPI:  49 y/o Female with PMHx of recurrent pna, HTN, COPD, anxiety, presents to the ED with complain of progressively worsening sob and productive cough with dark green sputum x 4 days. She denies any fever at home. Denies chest discomfort, body aches, loss of smell or taste.Oxygen level upon ED arrival was 86% ORA. Patient has had recurrent pna since she was 19, last episode x1.5 years ago. Patient last saw her pulmonologist Dr. Trotter ([place]) x1.5 years ago. No recent travel. Patient reports her son recently had a cold on antibiotics, had a negative COVID test. NKDA. Patient is COVID vaccinated. Here covid (-), xray patchy lung infiltrates, CT chest with multifocal pneumonia, hilar/mediastinal LN, was given rocephin azithromycin initially then switched to IV zosyn 6/26.       PMH: as above  PSH: as above  Meds: per reconciliation sheet, noted below  MEDICATIONS  (STANDING):  ALBUTerol    90 MICROgram(s) HFA Inhaler 2 Puff(s) Inhalation every 6 hours  azithromycin   Tablet 250 milliGRAM(s) Oral daily  budesonide 160 MICROgram(s)/formoterol 4.5 MICROgram(s) Inhaler 2 Puff(s) Inhalation two times a day  folic acid 1 milliGRAM(s) Oral daily  heparin   Injectable 5000 Unit(s) SubCutaneous every 8 hours  hydrochlorothiazide 12.5 milliGRAM(s) Oral daily  levothyroxine 112 MICROGram(s) Oral daily  losartan 50 milliGRAM(s) Oral daily  nicotine - 21 mG/24Hr(s) Patch 1 patch Transdermal daily  piperacillin/tazobactam IVPB.. 3.375 Gram(s) IV Intermittent every 8 hours  sodium chloride 0.9%. 500 milliLiter(s) (125 mL/Hr) IV Continuous <Continuous>  thiamine 100 milliGRAM(s) Oral daily  venlafaxine XR. 225 milliGRAM(s) Oral daily    Allergies    No Known Allergies    Intolerances      Social: no smoking, no alcohol, no illegal drugs; no recent travel, no exposure to TB  FAMILY HISTORY:  No pertinent family history in first degree relatives       no history of premature cardiovascular disease in first degree relatives    ROS: the patient denies fever, no chills, no HA, no dizziness, no sore throat, no blurry vision, no CP, no palpitations, no abdominal pain, no diarrhea, no N/V, no dysuria, no leg pain, no claudication, no rash, no joint aches, no rectal pain or bleeding, no night sweats    All other systems reviewed and are negative    Vital Signs Last 24 Hrs  T(C): 36.7 (27 Jun 2021 08:40), Max: 38 (26 Jun 2021 16:27)  T(F): 98 (27 Jun 2021 08:40), Max: 100.4 (26 Jun 2021 16:27)  HR: 62 (27 Jun 2021 09:45) (62 - 88)  BP: 97/58 (27 Jun 2021 08:40) (94/53 - 105/65)  BP(mean): --  RR: 18 (27 Jun 2021 08:40) (18 - 19)  SpO2: 97% (27 Jun 2021 09:45) (95% - 99%)  Daily     Daily     PE:  Constitutional: frail looking  HEENT: NC/AT, EOMI, PERRLA, conjunctivae clear; ears and nose atraumatic; pharynx benign  Neck: supple; thyroid not palpable  Back: no tenderness  Respiratory: decreased breath sounds, rhonchi  Cardiovascular: S1S2 regular, no murmurs  Abdomen: soft, not tender, not distended, positive BS; liver and spleen WNL  Genitourinary: no suprapubic tenderness  Lymphatic: no LN palpable  Musculoskeletal: no muscle tenderness, no joint swelling or tenderness  Extremities: no pedal edema  Neurological/ Psychiatric: AxOx3, Judgement and insight normal;  moving all extremities  Skin: no rashes; no palpable lesions    Labs: all available labs reviewed                        10.8   10.13 )-----------( 318      ( 27 Jun 2021 06:24 )             33.0     06-27    135  |  103  |  11  ----------------------------<  105<H>  3.7   |  29  |  0.28<L>    Ca    8.7      27 Jun 2021 06:24  Phos  3.6     06-26  Mg     2.3     06-27    TPro  7.2  /  Alb  2.9<L>  /  TBili  0.3  /  DBili  <0.1  /  AST  24  /  ALT  20  /  AlkPhos  84  06-25     LIVER FUNCTIONS - ( 25 Jun 2021 15:56 )  Alb: 2.9 g/dL / Pro: 7.2 gm/dL / ALK PHOS: 84 U/L / ALT: 20 U/L / AST: 24 U/L / GGT: x             Radiology: all available radiological tests reviewed      CT chest    INTERPRETATION:  [facility] RADIOLOGIST PRELIMINARY REPORT    PROCEDURE INFORMATION:  Exam: CT Chest Without Contrast; Diagnostic  Exam date and time: 6/26/2021 6:00 PM  Age: 50 years old  Clinical indication: Fever    TECHNIQUE:  Imaging protocol: Diagnostic computed tomographyof the chest without contrast.    COMPARISON:  CR XR CHEST URGENT 6/24/2021 6:06 PM    FINDINGS:  Lungs: Diffuse infiltrates and multifocal airspace disease throughout both  lungs indicating pneumonia.  Pleural spaces: No pneumothorax.  Heart: Unremarkable.  Aorta: No aneurysm.  Lymph nodes: Hilar and mediastinal lymphadenopathy.    Bones/joints: Unremarkable.  Soft tissues: Unremarkable.    IMPRESSION:  Diffuse bilateral pneumonia and lymphadenopathy.        Advanced directives addressed: full resuscitation
  HPI:    50 y.o.f with PMHx of recurrent pna, HTN, COPD, anxiety, admitted with complain of progressively worsening sob and productive cough with dark green sputum x 4 days. She denies any fever at home. Denies chest discomfort, body aches, loss of smell or taste. Oxygen level upon ED arrival was 86% ORA. Patient has had recurrent pna since she was 19, last episode x1.5 years ago. Patient last saw her pulmonologist Dr. Trotter ([place]) x1.5 years ago. No recent travel. Patient reports her son recently had a cold on antibiotics, had a negative COVID test. Patient is COVID vaccinated. No other complain. pat 40PPD smoking history. pat on [facility] protocol for etoh use.    PAST MEDICAL & SURGICAL HISTORY:  Recurrent pneumonia    No significant past surgical history    Home Medications:  Advair Diskus 250 mcg-50 mcg inhalation powder: 1 puff(s) inhaled once a day (25 Jun 2021 12:11)  Albuterol (Eqv-ProAir HFA) 90 mcg/inh inhalation aerosol: 2 puff(s) inhaled every 6 hours, As Needed - for shortness of breath and/or wheezing (25 Jun 2021 12:11)  ALPRAZolam 1 mg oral tablet: 1 tab(s) orally 4 times a day, As Needed - for anxiety, for insomnia (25 Jun 2021 12:11)  cholecalciferol 50,000 intl units (1250 mcg) oral capsule: 1 cap(s) orally 2 times a week (25 Jun 2021 12:11)  hydroCHLOROthiazide 12.5 mg oral tablet: 1 tab(s) orally once a day (25 Jun 2021 12:11)  losartan 50 mg oral tablet: 1 tab(s) orally once a day (25 Jun 2021 12:11)  metFORMIN 500 mg oral tablet: 4 tab(s) orally once a day  ****Used for weight loss*** (25 Jun 2021 12:11)  Moderna COVID-19 Vaccine  mcg/0.5 mL intramuscular suspension: 0.5 milliliter(s) intramuscular once  ****Course Completed as of 02/21*** (25 Jun 2021 12:11)  Synthroid 112 mcg (0.112 mg) oral tablet: 1 tab(s) orally once a day (25 Jun 2021 12:11)  venlafaxine 150 mg oral capsule, extended release: 1 cap(s) orally once a day  ****Combined with 75mg for a TDD = 225mg*** (25 Jun 2021 12:11)  venlafaxine 75 mg oral tablet, extended release: 1 tab(s) orally once a day  ****Combined with 150mg for a TDD = 225mg** (25 Jun 2021 12:11)      MEDICATIONS  (STANDING):  ALBUTerol    90 MICROgram(s) HFA Inhaler 2 Puff(s) Inhalation every 6 hours  budesonide 160 MICROgram(s)/formoterol 4.5 MICROgram(s) Inhaler 2 Puff(s) Inhalation two times a day  folic acid 1 milliGRAM(s) Oral daily  hydrochlorothiazide 12.5 milliGRAM(s) Oral daily  levothyroxine 112 MICROGram(s) Oral daily  losartan 50 milliGRAM(s) Oral daily  nicotine - 21 mG/24Hr(s) Patch 1 patch Transdermal daily  piperacillin/tazobactam IVPB.. 3.375 Gram(s) IV Intermittent every 8 hours  sodium chloride 0.9%. 500 milliLiter(s) (125 mL/Hr) IV Continuous <Continuous>  thiamine 100 milliGRAM(s) Oral daily  venlafaxine XR. 225 milliGRAM(s) Oral daily    MEDICATIONS  (PRN):  acetaminophen   Tablet .. 650 milliGRAM(s) Oral once PRN Temp greater or equal to 38C (100.4F), Mild Pain (1 - 3)  acetaminophen   Tablet .. 650 milliGRAM(s) Oral every 6 hours PRN Temp greater or equal to 38C (100.4F), Mild Pain (1 - 3)  ALPRAZolam 1 milliGRAM(s) Oral four times a day PRN for anxiety, for insomnia  LORazepam     Tablet 2 milliGRAM(s) Oral every 2 hours PRN Symptom-triggered 2 point increase in CIWA-Ar  LORazepam   Injectable 2 milliGRAM(s) IV Push every 1 hour PRN Symptom-triggered: each CIWA -Ar score 8 or GREATER  LORazepam   Injectable 2 milliGRAM(s) IV Push every 2 hours PRN CIWA-Ar score increase by 2 points and a total score of 7 or less  LORazepam   Injectable 2 milliGRAM(s) IV Push every 1 hour PRN CIWA-Ar score 8 or greater      Allergies    No Known Allergies    Intolerances        SOCIAL HISTORY: Denies tobacco, etoh abuse or illicit drug use    FAMILY HISTORY:  No pertinent family history in first degree relatives        Vital Signs Last 24 Hrs  T(C): 37.6 (26 Jun 2021 08:30), Max: 37.9 (25 Jun 2021 15:24)  T(F): 99.6 (26 Jun 2021 08:30), Max: 100.3 (25 Jun 2021 15:24)  HR: 74 (26 Jun 2021 08:55) (70 - 84)  BP: 89/48 (26 Jun 2021 08:30) (89/48 - 112/60)  BP(mean): --  RR: 20 (26 Jun 2021 08:30) (18 - 24)  SpO2: 90% (26 Jun 2021 08:55) (90% - 95%)        REVIEW OF SYSTEMS:    CONSTITUTIONAL:  As per HPI.  HEENT:  Eyes:  No diplopia or blurred vision. ENT:  No earache, sore throat or runny nose.  CARDIOVASCULAR:  No pressure, squeezing, tightness, heaviness or aching about the chest, neck, axilla or epigastrium.  RESPIRATORY:  + cough, +shortness of breath, PND or orthopnea.  GASTROINTESTINAL:  No nausea, vomiting or diarrhea.  GENITOURINARY:  No dysuria, frequency or urgency.  MUSCULOSKELETAL:  As per HPI.  SKIN:  No change in skin, hair or nails.  NEUROLOGIC:  No paresthesias, fasciculations, seizures or weakness.  PSYCHIATRIC:  No disorder of thought or mood.  ENDOCRINE:  No heat or cold intolerance, polyuria or polydipsia.  HEMATOLOGICAL:  No easy bruising or bleedings:  .     PHYSICAL EXAMINATION:    GENERAL APPEARANCE:  Pt. is not currently dyspneic, in no distress. Pt. is alert, oriented, and pleasant.  HEENT:  Pupils are normal and react normally. No icterus. Mucous membranes well colored.  NECK:  Supple. No lymphadenopathy. Jugular venous pressure not elevated. Carotids equal.   HEART:   The cardiac impulse has a normal quality. Regular. Normal S1 and S2. There are no murmurs, rubs or gallops noted  CHEST:  Chest crackles to auscultation. Normal respiratory effort.  ABDOMEN:  Soft and nontender.   EXTREMITIES:  There is no cyanosis, clubbing or edema.   SKIN:  No rash or significant lesions are noted.    LABS:                        11.2   12.54 )-----------( 313      ( 26 Jun 2021 05:59 )             35.1     06-26    137  |  105  |  10  ----------------------------<  91  3.8   |  28  |  0.35<L>    Ca    9.0      26 Jun 2021 05:59  Phos  3.6     06-26  Mg     2.1     06-26    TPro  7.2  /  Alb  2.9<L>  /  TBili  0.3  /  DBili  <0.1  /  AST  24  /  ALT  20  /  AlkPhos  84  06-25    LIVER FUNCTIONS - ( 25 Jun 2021 15:56 )  Alb: 2.9 g/dL / Pro: 7.2 gm/dL / ALK PHOS: 84 U/L / ALT: 20 U/L / AST: 24 U/L / GGT: x             Culture - Blood (collected 06-24-21 @ 18:39)  Source: .Blood None  Preliminary Report (06-25-21 @ 23:01):    No growth to date.    Culture - Blood (collected 06-24-21 @ 18:39)  Source: .Blood None  Preliminary Report (06-25-21 @ 23:01):    No growth to date.        RADIOLOGY & ADDITIONAL STUDIES:     Xray Chest 1 View- PORTABLE-Urgent (06.24.21 @ 18:18) >  HISTORY: Shortness of breath and cough    COMPARISON STUDY: 5/20/2019    Frontal expiratory view of the chest shows the heart to be normal in size. The lungs show bilateral patchy pulmonary infiltrates and there is no evidence of pneumothorax nor pleural effusion.    IMPRESSION:  Patchy infiltrates. Differential diagnosis includes Covid-19 pneumonia among other possibilities.

## 2021-06-27 NOTE — PROGRESS NOTE ADULT - ASSESSMENT
PROBLEMS;    Hypoxaemic respiratory failure  Community Acquired Pneumonia-b/l pneumonia  COPD  Tobacco abuse  ETOH Abuse  Depression    PLAN:    iv solumedrols 40mg q12hr  IV zosyn and Zithromax  AEROSOLS  Tobacco abuse/advised to quit smoking/Nicotine patch   ETOH Abuse-[place] protocol  SCD for DVT ppx

## 2021-06-27 NOTE — CONSULT NOTE ADULT - ASSESSMENT
51 y/o Female with PMHx of recurrent pna, HTN, COPD, anxiety, presents to the ED with complain of progressively worsening sob and productive cough with dark green sputum x 4 days. She denies any fever at home. Denies chest discomfort, body aches, loss of smell or taste. Oxygen level upon ED arrival was 86% ORA. Patient has had recurrent pna since she was 19, last episode x1.5 years ago. Patient last saw her pulmonologist Dr. Trotter ([place]) x1.5 years ago. No recent travel. Patient reports her son recently had a cold on antibiotics, had a negative COVID test. NKDA. Patient is COVID vaccinated. Here covid (-), xray patchy lung infiltrates, CT chest with multifocal pneumonia, hilar/mediastinal LN, was given rocephin azithromycin initially then switched to IV zosyn 6/26.     1. acute respiratory failure. multifocal pneumonia. COPD. alcohol abuse  - imaging reviewed, has LN as well - d/w pulm prior imaging to compare   - pulmonary eval noted  - s/p rocephin #2, on azithromycin #4  - on IV zosyn 3.375q8h #2  - continue with above antibiotic coverage  - check sputum cx if able   - consider bronchoscopy if not improving  - monitor temps  - tolerating abx well so far; no side effects noted  - reason for abx use and side effects reviewed with patient  - supportive care  - fu cbc    2. other issues - care per medicine 
PROBLEMS;    Community Acquired Pneumonia  COPD  Tobacco abuse  ETOH Abuse  Depression    PLAN:    IV zosyn and Zithromax  repeat cxr in am  AEROSOLS  Tobacco abuse/advised to quit smoking/Nicotine patch   ETOH Abuse-[facility] protocol  SCD for DVT ppx

## 2021-06-27 NOTE — PROGRESS NOTE ADULT - SUBJECTIVE AND OBJECTIVE BOX
Subjective:    pat better, sitting in chair, CT chest b/l infiltrates with enlarged LN, desaturate off oxygen.    Home Medications:  Advair Diskus 250 mcg-50 mcg inhalation powder: 1 puff(s) inhaled once a day (25 Jun 2021 12:11)  Albuterol (Eqv-ProAir HFA) 90 mcg/inh inhalation aerosol: 2 puff(s) inhaled every 6 hours, As Needed - for shortness of breath and/or wheezing (25 Jun 2021 12:11)  ALPRAZolam 1 mg oral tablet: 1 tab(s) orally 4 times a day, As Needed - for anxiety, for insomnia (25 Jun 2021 12:11)  cholecalciferol 50,000 intl units (1250 mcg) oral capsule: 1 cap(s) orally 2 times a week (25 Jun 2021 12:11)  hydroCHLOROthiazide 12.5 mg oral tablet: 1 tab(s) orally once a day (25 Jun 2021 12:11)  losartan 50 mg oral tablet: 1 tab(s) orally once a day (25 Jun 2021 12:11)  metFORMIN 500 mg oral tablet: 4 tab(s) orally once a day  ****Used for weight loss*** (25 Jun 2021 12:11)  Moderna COVID-19 Vaccine  mcg/0.5 mL intramuscular suspension: 0.5 milliliter(s) intramuscular once  ****Course Completed as of 02/21*** (25 Jun 2021 12:11)  Synthroid 112 mcg (0.112 mg) oral tablet: 1 tab(s) orally once a day (25 Jun 2021 12:11)  venlafaxine 150 mg oral capsule, extended release: 1 cap(s) orally once a day  ****Combined with 75mg for a TDD = 225mg*** (25 Jun 2021 12:11)  venlafaxine 75 mg oral tablet, extended release: 1 tab(s) orally once a day  ****Combined with 150mg for a TDD = 225mg** (25 Jun 2021 12:11)    MEDICATIONS  (STANDING):  ALBUTerol    90 MICROgram(s) HFA Inhaler 2 Puff(s) Inhalation every 6 hours  azithromycin   Tablet 250 milliGRAM(s) Oral daily  budesonide 160 MICROgram(s)/formoterol 4.5 MICROgram(s) Inhaler 2 Puff(s) Inhalation two times a day  folic acid 1 milliGRAM(s) Oral daily  heparin   Injectable 5000 Unit(s) SubCutaneous every 8 hours  hydrochlorothiazide 12.5 milliGRAM(s) Oral daily  levothyroxine 112 MICROGram(s) Oral daily  losartan 50 milliGRAM(s) Oral daily  nicotine - 21 mG/24Hr(s) Patch 1 patch Transdermal daily  piperacillin/tazobactam IVPB.. 3.375 Gram(s) IV Intermittent every 8 hours  sodium chloride 0.9%. 500 milliLiter(s) (125 mL/Hr) IV Continuous <Continuous>  thiamine 100 milliGRAM(s) Oral daily  venlafaxine XR. 225 milliGRAM(s) Oral daily    MEDICATIONS  (PRN):  acetaminophen   Tablet .. 650 milliGRAM(s) Oral every 6 hours PRN Temp greater or equal to 38C (100.4F), Mild Pain (1 - 3)  ALPRAZolam 1 milliGRAM(s) Oral four times a day PRN for anxiety, for insomnia  LORazepam     Tablet 2 milliGRAM(s) Oral every 2 hours PRN Symptom-triggered 2 point increase in CIWA-Ar  LORazepam   Injectable 2 milliGRAM(s) IV Push every 1 hour PRN Symptom-triggered: each CIWA -Ar score 8 or GREATER  LORazepam   Injectable 2 milliGRAM(s) IV Push every 1 hour PRN CIWA-Ar score 8 or greater  LORazepam   Injectable 2 milliGRAM(s) IV Push every 2 hours PRN CIWA-Ar score increase by 2 points and a total score of 7 or less      Allergies    No Known Allergies    Intolerances        Vital Signs Last 24 Hrs  T(C): 36.7 (27 Jun 2021 08:40), Max: 38 (26 Jun 2021 16:27)  T(F): 98 (27 Jun 2021 08:40), Max: 100.4 (26 Jun 2021 16:27)  HR: 62 (27 Jun 2021 09:45) (62 - 88)  BP: 97/58 (27 Jun 2021 08:40) (94/53 - 105/65)  BP(mean): --  RR: 18 (27 Jun 2021 08:40) (18 - 19)  SpO2: 97% (27 Jun 2021 09:45) (95% - 99%)      PHYSICAL EXAMINATION:    NECK:  Supple. No lymphadenopathy. Jugular venous pressure not elevated. Carotids equal.   HEART:   The cardiac impulse has a normal quality. Reg., Nl S1 and S2.  There are no murmurs, rubs or gallops noted  CHEST:  Chest crackles to auscultation. Normal respiratory effort.  ABDOMEN:  Soft and nontender.   EXTREMITIES:  There is no edema.       LABS:                        10.8   10.13 )-----------( 318      ( 27 Jun 2021 06:24 )             33.0     06-27    135  |  103  |  11  ----------------------------<  105<H>  3.7   |  29  |  0.28<L>    Ca    8.7      27 Jun 2021 06:24  Phos  3.6     06-26  Mg     2.3     06-27    TPro  7.2  /  Alb  2.9<L>  /  TBili  0.3  /  DBili  <0.1  /  AST  24  /  ALT  20  /  AlkPhos  84  06-25        CT Chest No Cont (06.26.21 @ 18:08) >  IMPRESSION:  Diffuse bilateral pneumonia and lymphadenopathy.

## 2021-06-27 NOTE — PROGRESS NOTE ADULT - ASSESSMENT
Community Acquired Pneumonia  -started on IV Ceftriaxone and ZIthromax  6/26 - wbc the same - change to zosyn in light of recurrent pna and chr etoh use   will get CT Chest as O2 use is new , discussed with Pulm   cont IV abx for the next two days then evaluate for home O2 needs on sunday     Suspected  COPD  her last PFTs were 1.5 yrs ago and pt reports she did not have COPD then   but is on advair at home  current smoker   rec see her pulm on follow-up for repeat PFTs   will consult pulm here  to optimize meds, consider steroids     Tobacco abuse  -advised to quit smoking  -on Nicotine patch     ETOH Abuse  per discussion with patient's , she drinks 1-2 bottles of wine every night  will place on CIWA protocol  6/26 - scoring low on CIWA at this time     Depression  on Venlafaxine   Psychiatry consult given depression and etoh abuse \  6/26 - rec taper xanax as OP; pt to speak to her psychiatrist about etoh use   pt seen by behavioral health team here     VTE Px - SCDs, HEP SC     Code status: Patient is Full code    6/25 - discussed with RN/   6/26 - discussed with RN and PUlm    Community Acquired Pneumonia  -started on IV Ceftriaxone and ZIthromax  6/26 - wbc the same - change to zosyn in light of recurrent pna and chr etoh use   will get CT Chest as O2 use is new , discussed with Pulm   cont IV abx for the next two days then evaluate for home O2 needs on sunday 6/27 - CT chest rev by me - shows b/l diffuse infiltrates, discussed with Pulm, add steroids, cannot r/o underlying lung disease such as ILD     Suspected  COPD  her last PFTs were 1.5 yrs ago and pt reports she did not have COPD then   but is on advair at home  current smoker   rec see her pulm on follow-up for repeat PFTs   will consult pulm here  to optimize meds, consider steroids   6/27 - started on steroids     Tobacco abuse  -advised to quit smoking  -on Nicotine patch     ETOH Abuse  per discussion with patient's , she drinks 1-2 bottles of wine every night  will place on CIWA protocol  6/26 - scoring low on CIWA at this time     Depression  on Venlafaxine   Psychiatry consult given depression and etoh abuse \  6/26 - rec taper xanax as OP; pt to speak to her psychiatrist about etoh use   pt seen by behavioral health team here     VTE Px - SCDs, HEP SC     Code status: Patient is Full code    6/27 - discussed with RN and PUlm    Community Acquired Pneumonia  -started on IV Ceftriaxone and ZIthromax  6/26 - wbc the same - change to zosyn in light of recurrent pna and chr etoh use   will get CT Chest as O2 use is new , discussed with Pulm   cont IV abx for the next two days then evaluate for home O2 needs on sunday 6/27 - CT chest rev by me - shows b/l diffuse infiltrates, discussed with Pulm, add steroids, cannot r/o underlying lung disease such as ILD   home O2 eval on 6/29 - ordered     Suspected  COPD  her last PFTs were 1.5 yrs ago and pt reports she did not have COPD then   but is on advair at home  current smoker   rec see her pulm on follow-up for repeat PFTs   will consult pulm here  to optimize meds, consider steroids   6/27 - started on steroids     Tobacco abuse  -advised to quit smoking  -on Nicotine patch     ETOH Abuse  per discussion with patient's , she drinks 1-2 bottles of wine every night  will place on CIWA protocol  6/26 - scoring low on CIWA at this time     Depression  on Venlafaxine   Psychiatry consult given depression and etoh abuse \  6/26 - rec taper xanax as OP; pt to speak to her psychiatrist about etoh use   pt seen by behavioral health team here     VTE Px - SCDs, HEP SC     Code status: Patient is Full code    6/27 - discussed with RN and PUlm

## 2021-06-28 LAB
ANION GAP SERPL CALC-SCNC: 5 MMOL/L — SIGNIFICANT CHANGE UP (ref 5–17)
BUN SERPL-MCNC: 10 MG/DL — SIGNIFICANT CHANGE UP (ref 7–23)
CALCIUM SERPL-MCNC: 9.3 MG/DL — SIGNIFICANT CHANGE UP (ref 8.5–10.1)
CHLORIDE SERPL-SCNC: 103 MMOL/L — SIGNIFICANT CHANGE UP (ref 96–108)
CO2 SERPL-SCNC: 28 MMOL/L — SIGNIFICANT CHANGE UP (ref 22–31)
CREAT SERPL-MCNC: 0.45 MG/DL — LOW (ref 0.5–1.3)
GLUCOSE SERPL-MCNC: 140 MG/DL — HIGH (ref 70–99)
HCT VFR BLD CALC: 35.9 % — SIGNIFICANT CHANGE UP (ref 34.5–45)
HGB BLD-MCNC: 11.8 G/DL — SIGNIFICANT CHANGE UP (ref 11.5–15.5)
MAGNESIUM SERPL-MCNC: 2.3 MG/DL — SIGNIFICANT CHANGE UP (ref 1.6–2.6)
MCHC RBC-ENTMCNC: 32.7 PG — SIGNIFICANT CHANGE UP (ref 27–34)
MCHC RBC-ENTMCNC: 32.9 GM/DL — SIGNIFICANT CHANGE UP (ref 32–36)
MCV RBC AUTO: 99.4 FL — SIGNIFICANT CHANGE UP (ref 80–100)
PLATELET # BLD AUTO: 385 K/UL — SIGNIFICANT CHANGE UP (ref 150–400)
POTASSIUM SERPL-MCNC: 4.5 MMOL/L — SIGNIFICANT CHANGE UP (ref 3.5–5.3)
POTASSIUM SERPL-SCNC: 4.5 MMOL/L — SIGNIFICANT CHANGE UP (ref 3.5–5.3)
RBC # BLD: 3.61 M/UL — LOW (ref 3.8–5.2)
RBC # FLD: 12.1 % — SIGNIFICANT CHANGE UP (ref 10.3–14.5)
SODIUM SERPL-SCNC: 136 MMOL/L — SIGNIFICANT CHANGE UP (ref 135–145)
WBC # BLD: 6.95 K/UL — SIGNIFICANT CHANGE UP (ref 3.8–10.5)
WBC # FLD AUTO: 6.95 K/UL — SIGNIFICANT CHANGE UP (ref 3.8–10.5)

## 2021-06-28 PROCEDURE — 99232 SBSQ HOSP IP/OBS MODERATE 35: CPT

## 2021-06-28 RX ORDER — SENNA PLUS 8.6 MG/1
2 TABLET ORAL AT BEDTIME
Refills: 0 | Status: DISCONTINUED | OUTPATIENT
Start: 2021-06-28 | End: 2021-06-30

## 2021-06-28 RX ADMIN — Medication 225 MILLIGRAM(S): at 10:19

## 2021-06-28 RX ADMIN — Medication 40 MILLIGRAM(S): at 21:56

## 2021-06-28 RX ADMIN — Medication 1 PATCH: at 10:19

## 2021-06-28 RX ADMIN — AZITHROMYCIN 250 MILLIGRAM(S): 500 TABLET, FILM COATED ORAL at 10:18

## 2021-06-28 RX ADMIN — BUDESONIDE AND FORMOTEROL FUMARATE DIHYDRATE 2 PUFF(S): 160; 4.5 AEROSOL RESPIRATORY (INHALATION) at 08:33

## 2021-06-28 RX ADMIN — Medication 100 MILLIGRAM(S): at 10:18

## 2021-06-28 RX ADMIN — HEPARIN SODIUM 5000 UNIT(S): 5000 INJECTION INTRAVENOUS; SUBCUTANEOUS at 13:43

## 2021-06-28 RX ADMIN — Medication 1 PATCH: at 17:25

## 2021-06-28 RX ADMIN — Medication 650 MILLIGRAM(S): at 11:19

## 2021-06-28 RX ADMIN — Medication 40 MILLIGRAM(S): at 10:18

## 2021-06-28 RX ADMIN — Medication 112 MICROGRAM(S): at 06:26

## 2021-06-28 RX ADMIN — PIPERACILLIN AND TAZOBACTAM 25 GRAM(S): 4; .5 INJECTION, POWDER, LYOPHILIZED, FOR SOLUTION INTRAVENOUS at 06:26

## 2021-06-28 RX ADMIN — Medication 650 MILLIGRAM(S): at 11:35

## 2021-06-28 RX ADMIN — SENNA PLUS 2 TABLET(S): 8.6 TABLET ORAL at 21:57

## 2021-06-28 RX ADMIN — Medication 1 MILLIGRAM(S): at 19:14

## 2021-06-28 RX ADMIN — HEPARIN SODIUM 5000 UNIT(S): 5000 INJECTION INTRAVENOUS; SUBCUTANEOUS at 06:26

## 2021-06-28 RX ADMIN — PIPERACILLIN AND TAZOBACTAM 25 GRAM(S): 4; .5 INJECTION, POWDER, LYOPHILIZED, FOR SOLUTION INTRAVENOUS at 21:56

## 2021-06-28 RX ADMIN — Medication 1 MILLIGRAM(S): at 10:18

## 2021-06-28 RX ADMIN — PIPERACILLIN AND TAZOBACTAM 25 GRAM(S): 4; .5 INJECTION, POWDER, LYOPHILIZED, FOR SOLUTION INTRAVENOUS at 13:43

## 2021-06-28 RX ADMIN — HEPARIN SODIUM 5000 UNIT(S): 5000 INJECTION INTRAVENOUS; SUBCUTANEOUS at 21:56

## 2021-06-28 RX ADMIN — BUDESONIDE AND FORMOTEROL FUMARATE DIHYDRATE 2 PUFF(S): 160; 4.5 AEROSOL RESPIRATORY (INHALATION) at 20:37

## 2021-06-28 NOTE — PROGRESS NOTE ADULT - ASSESSMENT
Community Acquired Pneumonia  -started on IV Ceftriaxone and ZIthromax  6/26 - wbc the same - change to zosyn in light of recurrent pna and chr etoh use   will get CT Chest as O2 use is new , discussed with Pulm   cont IV abx for the next two days then evaluate for home O2 needs on sunday 6/27 - CT chest rev by me - shows b/l diffuse infiltrates, discussed with Pulm, add steroids, cannot r/o underlying lung disease such as ILD   home O2 eval on 6/29 - ordered   6/28 - cont abx and steroids, check pulse oximetry prior to discharge     Suspected  COPD  her last PFTs were 1.5 yrs ago and pt reports she did not have COPD then   but is on advair at home  current smoker   rec see her pulm on follow-up for repeat PFTs   will consult pulm here  to optimize meds, consider steroids   6/27 - started on steroids     Tobacco abuse  -advised to quit smoking  -on Nicotine patch     ETOH Abuse  per discussion with patient's , she drinks 1-2 bottles of wine every night  will place on CIWA protocol  6/26 - scoring low on CIWA at this time     Depression  on Venlafaxine   Psychiatry consult given depression and etoh abuse \  6/26 - rec taper xanax as OP; pt to speak to her psychiatrist about etoh use   pt seen by behavioral health team here     VTE Px - SCDs, HEP SC     Code status: Patient is Full code    6/28 - discussed with RN and PUlm ;   dc planning - dc home wed/thur - needs home O2 eval prior to dc

## 2021-06-28 NOTE — PROGRESS NOTE ADULT - SUBJECTIVE AND OBJECTIVE BOX
Subjective:    Home Medications:  Advair Diskus 250 mcg-50 mcg inhalation powder: 1 puff(s) inhaled once a day (25 Jun 2021 12:11)  Albuterol (Eqv-ProAir HFA) 90 mcg/inh inhalation aerosol: 2 puff(s) inhaled every 6 hours, As Needed - for shortness of breath and/or wheezing (25 Jun 2021 12:11)  ALPRAZolam 1 mg oral tablet: 1 tab(s) orally 4 times a day, As Needed - for anxiety, for insomnia (25 Jun 2021 12:11)  cholecalciferol 50,000 intl units (1250 mcg) oral capsule: 1 cap(s) orally 2 times a week (25 Jun 2021 12:11)  hydroCHLOROthiazide 12.5 mg oral tablet: 1 tab(s) orally once a day (25 Jun 2021 12:11)  losartan 50 mg oral tablet: 1 tab(s) orally once a day (25 Jun 2021 12:11)  metFORMIN 500 mg oral tablet: 4 tab(s) orally once a day  ****Used for weight loss*** (25 Jun 2021 12:11)  Moderna COVID-19 Vaccine  mcg/0.5 mL intramuscular suspension: 0.5 milliliter(s) intramuscular once  ****Course Completed as of 02/21*** (25 Jun 2021 12:11)  Synthroid 112 mcg (0.112 mg) oral tablet: 1 tab(s) orally once a day (25 Jun 2021 12:11)  venlafaxine 150 mg oral capsule, extended release: 1 cap(s) orally once a day  ****Combined with 75mg for a TDD = 225mg*** (25 Jun 2021 12:11)  venlafaxine 75 mg oral tablet, extended release: 1 tab(s) orally once a day  ****Combined with 150mg for a TDD = 225mg** (25 Jun 2021 12:11)    MEDICATIONS  (STANDING):  ALBUTerol    90 MICROgram(s) HFA Inhaler 2 Puff(s) Inhalation every 6 hours  azithromycin   Tablet 250 milliGRAM(s) Oral daily  budesonide 160 MICROgram(s)/formoterol 4.5 MICROgram(s) Inhaler 2 Puff(s) Inhalation two times a day  folic acid 1 milliGRAM(s) Oral daily  heparin   Injectable 5000 Unit(s) SubCutaneous every 8 hours  hydrochlorothiazide 12.5 milliGRAM(s) Oral daily  levothyroxine 112 MICROGram(s) Oral daily  losartan 50 milliGRAM(s) Oral daily  methylPREDNISolone sodium succinate Injectable 40 milliGRAM(s) IV Push every 12 hours  nicotine - 21 mG/24Hr(s) Patch 1 patch Transdermal daily  piperacillin/tazobactam IVPB.. 3.375 Gram(s) IV Intermittent every 8 hours  sodium chloride 0.9%. 500 milliLiter(s) (125 mL/Hr) IV Continuous <Continuous>  thiamine 100 milliGRAM(s) Oral daily  venlafaxine XR. 225 milliGRAM(s) Oral daily    MEDICATIONS  (PRN):  acetaminophen   Tablet .. 650 milliGRAM(s) Oral every 6 hours PRN Temp greater or equal to 38C (100.4F), Mild Pain (1 - 3)  ALPRAZolam 1 milliGRAM(s) Oral four times a day PRN for anxiety, for insomnia  LORazepam     Tablet 2 milliGRAM(s) Oral every 2 hours PRN Symptom-triggered 2 point increase in CIWA-Ar  LORazepam   Injectable 2 milliGRAM(s) IV Push every 1 hour PRN Symptom-triggered: each CIWA -Ar score 8 or GREATER  LORazepam   Injectable 2 milliGRAM(s) IV Push every 2 hours PRN CIWA-Ar score increase by 2 points and a total score of 7 or less  LORazepam   Injectable 2 milliGRAM(s) IV Push every 1 hour PRN CIWA-Ar score 8 or greater      Allergies    No Known Allergies    Intolerances        Vital Signs Last 24 Hrs  T(C): 36.6 (28 Jun 2021 08:17), Max: 37.7 (27 Jun 2021 16:25)  T(F): 97.8 (28 Jun 2021 08:17), Max: 99.9 (27 Jun 2021 16:25)  HR: 63 (28 Jun 2021 08:17) (61 - 81)  BP: 96/67 (28 Jun 2021 08:17) (96/67 - 112/69)  BP(mean): --  RR: 16 (28 Jun 2021 08:17) (16 - 18)  SpO2: 98% (28 Jun 2021 08:17) (94% - 98%)      PHYSICAL EXAMINATION:    NECK:  Supple. No lymphadenopathy. Jugular venous pressure not elevated. Carotids equal.   HEART:   The cardiac impulse has a normal quality. Reg., Nl S1 and S2.  There are no murmurs, rubs or gallops noted  CHEST:  Chest is clear to auscultation. Normal respiratory effort.  ABDOMEN:  Soft and nontender.   EXTREMITIES:  There is no edema.       LABS:                        11.8   6.95  )-----------( 385      ( 28 Jun 2021 05:47 )             35.9     06-28    136  |  103  |  10  ----------------------------<  140<H>  4.5   |  28  |  0.45<L>    Ca    9.3      28 Jun 2021 05:47  Mg     2.3     06-28                 Subjective:    pat better, less congested, sitting in bed.    Home Medications:  Advair Diskus 250 mcg-50 mcg inhalation powder: 1 puff(s) inhaled once a day (25 Jun 2021 12:11)  Albuterol (Eqv-ProAir HFA) 90 mcg/inh inhalation aerosol: 2 puff(s) inhaled every 6 hours, As Needed - for shortness of breath and/or wheezing (25 Jun 2021 12:11)  ALPRAZolam 1 mg oral tablet: 1 tab(s) orally 4 times a day, As Needed - for anxiety, for insomnia (25 Jun 2021 12:11)  cholecalciferol 50,000 intl units (1250 mcg) oral capsule: 1 cap(s) orally 2 times a week (25 Jun 2021 12:11)  hydroCHLOROthiazide 12.5 mg oral tablet: 1 tab(s) orally once a day (25 Jun 2021 12:11)  losartan 50 mg oral tablet: 1 tab(s) orally once a day (25 Jun 2021 12:11)  metFORMIN 500 mg oral tablet: 4 tab(s) orally once a day  ****Used for weight loss*** (25 Jun 2021 12:11)  Moderna COVID-19 Vaccine  mcg/0.5 mL intramuscular suspension: 0.5 milliliter(s) intramuscular once  ****Course Completed as of 02/21*** (25 Jun 2021 12:11)  Synthroid 112 mcg (0.112 mg) oral tablet: 1 tab(s) orally once a day (25 Jun 2021 12:11)  venlafaxine 150 mg oral capsule, extended release: 1 cap(s) orally once a day  ****Combined with 75mg for a TDD = 225mg*** (25 Jun 2021 12:11)  venlafaxine 75 mg oral tablet, extended release: 1 tab(s) orally once a day  ****Combined with 150mg for a TDD = 225mg** (25 Jun 2021 12:11)    MEDICATIONS  (STANDING):  ALBUTerol    90 MICROgram(s) HFA Inhaler 2 Puff(s) Inhalation every 6 hours  azithromycin   Tablet 250 milliGRAM(s) Oral daily  budesonide 160 MICROgram(s)/formoterol 4.5 MICROgram(s) Inhaler 2 Puff(s) Inhalation two times a day  folic acid 1 milliGRAM(s) Oral daily  heparin   Injectable 5000 Unit(s) SubCutaneous every 8 hours  hydrochlorothiazide 12.5 milliGRAM(s) Oral daily  levothyroxine 112 MICROGram(s) Oral daily  losartan 50 milliGRAM(s) Oral daily  methylPREDNISolone sodium succinate Injectable 40 milliGRAM(s) IV Push every 12 hours  nicotine - 21 mG/24Hr(s) Patch 1 patch Transdermal daily  piperacillin/tazobactam IVPB.. 3.375 Gram(s) IV Intermittent every 8 hours  sodium chloride 0.9%. 500 milliLiter(s) (125 mL/Hr) IV Continuous <Continuous>  thiamine 100 milliGRAM(s) Oral daily  venlafaxine XR. 225 milliGRAM(s) Oral daily    MEDICATIONS  (PRN):  acetaminophen   Tablet .. 650 milliGRAM(s) Oral every 6 hours PRN Temp greater or equal to 38C (100.4F), Mild Pain (1 - 3)  ALPRAZolam 1 milliGRAM(s) Oral four times a day PRN for anxiety, for insomnia  LORazepam     Tablet 2 milliGRAM(s) Oral every 2 hours PRN Symptom-triggered 2 point increase in CIWA-Ar  LORazepam   Injectable 2 milliGRAM(s) IV Push every 1 hour PRN Symptom-triggered: each CIWA -Ar score 8 or GREATER  LORazepam   Injectable 2 milliGRAM(s) IV Push every 2 hours PRN CIWA-Ar score increase by 2 points and a total score of 7 or less  LORazepam   Injectable 2 milliGRAM(s) IV Push every 1 hour PRN CIWA-Ar score 8 or greater      Allergies    No Known Allergies    Intolerances        Vital Signs Last 24 Hrs  T(C): 36.6 (28 Jun 2021 08:17), Max: 37.7 (27 Jun 2021 16:25)  T(F): 97.8 (28 Jun 2021 08:17), Max: 99.9 (27 Jun 2021 16:25)  HR: 63 (28 Jun 2021 08:17) (61 - 81)  BP: 96/67 (28 Jun 2021 08:17) (96/67 - 112/69)  BP(mean): --  RR: 16 (28 Jun 2021 08:17) (16 - 18)  SpO2: 98% (28 Jun 2021 08:17) (94% - 98%)      PHYSICAL EXAMINATION:    NECK:  Supple. No lymphadenopathy. Jugular venous pressure not elevated. Carotids equal.   HEART:   The cardiac impulse has a normal quality. Reg., Nl S1 and S2.  There are no murmurs, rubs or gallops noted  CHEST:  Chest crackles to auscultation. Normal respiratory effort.  ABDOMEN:  Soft and nontender.   EXTREMITIES:  There is no edema.       LABS:                        11.8   6.95  )-----------( 385      ( 28 Jun 2021 05:47 )             35.9     06-28    136  |  103  |  10  ----------------------------<  140<H>  4.5   |  28  |  0.45<L>    Ca    9.3      28 Jun 2021 05:47  Mg     2.3     06-28

## 2021-06-28 NOTE — CHART NOTE - NSCHARTNOTEFT_GEN_A_CORE
HOME O2 EVALUATION    Pulse Ox Room Air Rest:91    Pulse Ox Room Air Ambulatin    Pulse Ox on O2          L Ambulating:    Pulse Ox Post Ambulation:
HOME O2 EVALUATION    Pulse Ox Room Air Rest: 85%    Pulse Ox Room Air Ambulating:    Pulse Ox on O2          L Ambulating:    Pulse Ox Post Ambulation:

## 2021-06-28 NOTE — PROGRESS NOTE ADULT - ASSESSMENT
51 y/o Female with PMHx of recurrent pna, HTN, COPD, anxiety, presents to the ED with complain of progressively worsening sob and productive cough with dark green sputum x 4 days. She denies any fever at home. Denies chest discomfort, body aches, loss of smell or taste. Oxygen level upon ED arrival was 86% ORA. Patient has had recurrent pna since she was 19, last episode x1.5 years ago. Patient last saw her pulmonologist Dr. Trotter ([place]) x1.5 years ago. No recent travel. Patient reports her son recently had a cold on antibiotics, had a negative COVID test. NKDA. Patient is COVID vaccinated. Here covid (-), xray patchy lung infiltrates, CT chest with multifocal pneumonia, hilar/mediastinal LN, was given rocephin azithromycin initially then switched to IV zosyn 6/26.     1. acute respiratory failure. multifocal pneumonia. pneumonitis. COPD. alcohol abuse  - imaging reviewed, pna vs pneumonitis vs vasculitis ?  - pulmonary eval noted, started on IV steroids   - s/p rocephin #2, on azithromycin #5  - on IV zosyn 3.375q8h #3  - continue with broad spectrum antibiotic coverage  - check sputum cx if able   - consider rheum w/u  - consider bronchoscopy if not improving  - monitor temps  - tolerating abx well so far; no side effects noted  - reason for abx use and side effects reviewed with patient  - supportive care  - fu cbc    2. other issues - care per medicine

## 2021-06-28 NOTE — PROGRESS NOTE ADULT - SUBJECTIVE AND OBJECTIVE BOX
Date of service: 06-28-21 @ 10:58    pt seen and examined  feels better this am  has cough  has dyspnea on exertion   temps down     ROS: no fever or chills; denies dizziness, no HA, no SOB or cough, no abdominal pain, no diarrhea or constipation; no dysuria, no urinary frequency, no legs pain, no rashes    MEDICATIONS  (STANDING):  ALBUTerol    90 MICROgram(s) HFA Inhaler 2 Puff(s) Inhalation every 6 hours  azithromycin   Tablet 250 milliGRAM(s) Oral daily  budesonide 160 MICROgram(s)/formoterol 4.5 MICROgram(s) Inhaler 2 Puff(s) Inhalation two times a day  folic acid 1 milliGRAM(s) Oral daily  heparin   Injectable 5000 Unit(s) SubCutaneous every 8 hours  hydrochlorothiazide 12.5 milliGRAM(s) Oral daily  levothyroxine 112 MICROGram(s) Oral daily  losartan 50 milliGRAM(s) Oral daily  methylPREDNISolone sodium succinate Injectable 40 milliGRAM(s) IV Push every 12 hours  nicotine - 21 mG/24Hr(s) Patch 1 patch Transdermal daily  piperacillin/tazobactam IVPB.. 3.375 Gram(s) IV Intermittent every 8 hours  sodium chloride 0.9%. 500 milliLiter(s) (125 mL/Hr) IV Continuous <Continuous>  venlafaxine XR. 225 milliGRAM(s) Oral daily    Vital Signs Last 24 Hrs  T(C): 36.6 (28 Jun 2021 08:17), Max: 37.7 (27 Jun 2021 16:25)  T(F): 97.8 (28 Jun 2021 08:17), Max: 99.9 (27 Jun 2021 16:25)  HR: 70 (28 Jun 2021 08:30) (61 - 81)  BP: 96/67 (28 Jun 2021 08:17) (96/67 - 112/69)  BP(mean): --  RR: 16 (28 Jun 2021 08:17) (16 - 18)  SpO2: 98% (28 Jun 2021 08:17) (94% - 98%)      PE:  Constitutional: frail looking  HEENT: NC/AT, EOMI, PERRLA, conjunctivae clear; ears and nose atraumatic; pharynx benign  Neck: supple; thyroid not palpable  Back: no tenderness  Respiratory: decreased breath sounds, rhonchi  Cardiovascular: S1S2 regular, no murmurs  Abdomen: soft, not tender, not distended, positive BS; liver and spleen WNL  Genitourinary: no suprapubic tenderness  Lymphatic: no LN palpable  Musculoskeletal: no muscle tenderness, no joint swelling or tenderness  Extremities: no pedal edema  Neurological/ Psychiatric: AxOx3, Judgement and insight normal;  moving all extremities  Skin: no rashes; no palpable lesions    Labs: all available labs reviewed                                   11.8   6.95  )-----------( 385      ( 28 Jun 2021 05:47 )             35.9     06-28    136  |  103  |  10  ----------------------------<  140<H>  4.5   |  28  |  0.45<L>    Ca    9.3      28 Jun 2021 05:47  Mg     2.3     06-28      Radiology: all available radiological tests reviewed      CT chest    INTERPRETATION:  [facility] RADIOLOGIST PRELIMINARY REPORT    PROCEDURE INFORMATION:  Exam: CT Chest Without Contrast; Diagnostic  Exam date and time: 6/26/2021 6:00 PM  Age: 50 years old  Clinical indication: Fever    TECHNIQUE:  Imaging protocol: Diagnostic computed tomographyof the chest without contrast.    COMPARISON:  CR XR CHEST URGENT 6/24/2021 6:06 PM    FINDINGS:  Lungs: Diffuse infiltrates and multifocal airspace disease throughout both  lungs indicating pneumonia.  Pleural spaces: No pneumothorax.  Heart: Unremarkable.  Aorta: No aneurysm.  Lymph nodes: Hilar and mediastinal lymphadenopathy.    Bones/joints: Unremarkable.  Soft tissues: Unremarkable.    IMPRESSION:  Diffuse bilateral pneumonia and lymphadenopathy.        Advanced directives addressed: full resuscitation

## 2021-06-28 NOTE — PROGRESS NOTE ADULT - ASSESSMENT
PROBLEMS;    Hypoxaemic respiratory failure  Community Acquired Pneumonia-b/l pneumonia  COPD  Tobacco abuse  ETOH Abuse  Depression    PLAN:    iv solumedrols 40mg q12hr  IV zosyn and Zithromax  AEROSOLS  Tobacco abuse/advised to quit smoking/Nicotine patch   ETOH Abuse-[place] protocol  SCD for DVT ppx PROBLEMS;    Hypoxaemic respiratory failure  Community Acquired Pneumonia-b/l pneumonia  COPD  Tobacco abuse  ETOH Abuse  Depression    PLAN:    pulmonary better  iv solumedrols 40mg q12hr  IV zosyn and Zithromax  AEROSOLS  Tobacco abuse/advised to quit smoking/Nicotine patch   ETOH Abuse-[facility] protocol  SCD for DVT ppx

## 2021-06-28 NOTE — PROGRESS NOTE ADULT - SUBJECTIVE AND OBJECTIVE BOX
CHIEF COMPLAINT:49 y/o Female with PMHx of recurrent pna, HTN, COPD, anxiety, presents to the ED with complain of progressively worsening sob and productive cough with dark green sputum x 4 days. She denies any fever at home. Denies chest discomfort, body aches, loss of smell or taste.Oxygen level upon ED arrival was 86% ORA. Patient has had recurrent pna since she was 19, last episode x1.5 years ago. Patient last saw her pulmonologist Dr. Trotter ([place]) x1.5 years ago. No recent travel. Patient reports her son recently had a cold on antibiotics, had a negative COVID test. NKDA. Patient is COVID vaccinated. No other complain.    6/25 - no cp palps sob; has been coughing; ambulating, O2 is new for her   6/26 - no cp palps sob; cough improving   6/27 - no cp palps; o2 sats dropped on ambulation, no other complaints   6/28 - no cp palps sob at rest; no abdo pain; is constipated     PHYSICAL EXAM:  Vital Signs Last 24 Hrs  T(C): 36.6 (28 Jun 2021 15:35), Max: 36.7 (28 Jun 2021 01:04)  T(F): 97.8 (28 Jun 2021 15:35), Max: 98 (28 Jun 2021 01:04)  HR: 70 (28 Jun 2021 15:35) (61 - 70)  BP: 100/66 (28 Jun 2021 15:35) (96/67 - 112/69)  RR: 18 (28 Jun 2021 15:35) (16 - 18)  SpO2: 98% (28 Jun 2021 15:35) (94% - 98%)  Constitutional: NAD, awake and alert, well-developed  HEENT: PERR, EOMI  Neck: Soft and supple,  No JVD  Respiratory: few rhonchi; on NC   Cardiovascular: S1 and S2, regular rate and rhythm, no Murmurs  Gastrointestinal: Bowel Sounds present, soft, nontender, nondistended  Extremities: No peripheral edema  Vascular: 2+ peripheral pulses  Neurological: A/O x 3, no focal deficits  Musculoskeletal: 5/5 strength b/l upper and lower extremities  Skin: No rashes    RADIOLOGY/EKG:  < from: Xray Chest 1 View- PORTABLE-Urgent (06.24.21 @ 18:18) >  Patchy infiltrates. Differential diagnosis includes Covid-19 pneumonia among other possibilities.    < from: CT Chest No Cont (06.26.21 @ 18:08) >  IMPRESSION:  Diffuse bilateral pneumonia and lymphadenopathy.      LABS: All Labs Reviewed:                        11.8   6.95  )-----------( 385      ( 28 Jun 2021 05:47 )             35.9     136  |  103  |  10  ----------------------------<  140<H>  4.5   |  28  |  0.45<L>    Ca    9.3      28 Jun 2021 05:47  Mg     2.3     06-28    MEDS:   acetaminophen   Tablet .. 650 milliGRAM(s) Oral every 6 hours PRN  ALBUTerol    90 MICROgram(s) HFA Inhaler 2 Puff(s) Inhalation every 6 hours  ALPRAZolam 1 milliGRAM(s) Oral four times a day PRN  azithromycin   Tablet 250 milliGRAM(s) Oral daily  budesonide 160 MICROgram(s)/formoterol 4.5 MICROgram(s) Inhaler 2 Puff(s) Inhalation two times a day  folic acid 1 milliGRAM(s) Oral daily  heparin   Injectable 5000 Unit(s) SubCutaneous every 8 hours  hydrochlorothiazide 12.5 milliGRAM(s) Oral daily  levothyroxine 112 MICROGram(s) Oral daily  LORazepam     Tablet 2 milliGRAM(s) Oral every 2 hours PRN  LORazepam   Injectable 2 milliGRAM(s) IV Push every 1 hour PRN  LORazepam   Injectable 2 milliGRAM(s) IV Push every 2 hours PRN  LORazepam   Injectable 2 milliGRAM(s) IV Push every 1 hour PRN  losartan 50 milliGRAM(s) Oral daily  methylPREDNISolone sodium succinate Injectable 40 milliGRAM(s) IV Push every 12 hours  nicotine - 21 mG/24Hr(s) Patch 1 patch Transdermal daily  piperacillin/tazobactam IVPB.. 3.375 Gram(s) IV Intermittent every 8 hours  sodium chloride 0.9%. 500 milliLiter(s) IV Continuous <Continuous>  venlafaxine XR. 225 milliGRAM(s) Oral daily

## 2021-06-29 LAB
CULTURE RESULTS: SIGNIFICANT CHANGE UP
CULTURE RESULTS: SIGNIFICANT CHANGE UP
ERYTHROCYTE [SEDIMENTATION RATE] IN BLOOD: 97 MM/HR — HIGH (ref 0–20)
IGA FLD-MCNC: 208 MG/DL — SIGNIFICANT CHANGE UP (ref 84–499)
IGG FLD-MCNC: 888 MG/DL — SIGNIFICANT CHANGE UP (ref 610–1660)
IGM SERPL-MCNC: 173 MG/DL — SIGNIFICANT CHANGE UP (ref 35–242)
KAPPA LC SER QL IFE: 2.38 MG/DL — HIGH (ref 0.33–1.94)
KAPPA/LAMBDA FREE LIGHT CHAIN RATIO, SERUM: 0.6 RATIO — SIGNIFICANT CHANGE UP (ref 0.26–1.65)
LAMBDA LC SER QL IFE: 3.97 MG/DL — HIGH (ref 0.57–2.63)
SPECIMEN SOURCE: SIGNIFICANT CHANGE UP
SPECIMEN SOURCE: SIGNIFICANT CHANGE UP
TSH SERPL-MCNC: 0.35 UU/ML — SIGNIFICANT CHANGE UP (ref 0.34–4.82)

## 2021-06-29 PROCEDURE — 99232 SBSQ HOSP IP/OBS MODERATE 35: CPT

## 2021-06-29 PROCEDURE — 71046 X-RAY EXAM CHEST 2 VIEWS: CPT | Mod: 26

## 2021-06-29 RX ORDER — LANOLIN ALCOHOL/MO/W.PET/CERES
5 CREAM (GRAM) TOPICAL AT BEDTIME
Refills: 0 | Status: DISCONTINUED | OUTPATIENT
Start: 2021-06-29 | End: 2021-06-30

## 2021-06-29 RX ADMIN — PIPERACILLIN AND TAZOBACTAM 25 GRAM(S): 4; .5 INJECTION, POWDER, LYOPHILIZED, FOR SOLUTION INTRAVENOUS at 13:18

## 2021-06-29 RX ADMIN — Medication 5 MILLIGRAM(S): at 22:28

## 2021-06-29 RX ADMIN — Medication 112 MICROGRAM(S): at 05:41

## 2021-06-29 RX ADMIN — HEPARIN SODIUM 5000 UNIT(S): 5000 INJECTION INTRAVENOUS; SUBCUTANEOUS at 13:17

## 2021-06-29 RX ADMIN — PIPERACILLIN AND TAZOBACTAM 25 GRAM(S): 4; .5 INJECTION, POWDER, LYOPHILIZED, FOR SOLUTION INTRAVENOUS at 22:29

## 2021-06-29 RX ADMIN — PIPERACILLIN AND TAZOBACTAM 25 GRAM(S): 4; .5 INJECTION, POWDER, LYOPHILIZED, FOR SOLUTION INTRAVENOUS at 05:40

## 2021-06-29 RX ADMIN — AZITHROMYCIN 250 MILLIGRAM(S): 500 TABLET, FILM COATED ORAL at 09:52

## 2021-06-29 RX ADMIN — Medication 1 MILLIGRAM(S): at 00:13

## 2021-06-29 RX ADMIN — Medication 225 MILLIGRAM(S): at 09:52

## 2021-06-29 RX ADMIN — SENNA PLUS 2 TABLET(S): 8.6 TABLET ORAL at 22:28

## 2021-06-29 RX ADMIN — Medication 40 MILLIGRAM(S): at 22:28

## 2021-06-29 RX ADMIN — Medication 40 MILLIGRAM(S): at 09:52

## 2021-06-29 RX ADMIN — BUDESONIDE AND FORMOTEROL FUMARATE DIHYDRATE 2 PUFF(S): 160; 4.5 AEROSOL RESPIRATORY (INHALATION) at 21:03

## 2021-06-29 RX ADMIN — HEPARIN SODIUM 5000 UNIT(S): 5000 INJECTION INTRAVENOUS; SUBCUTANEOUS at 22:29

## 2021-06-29 RX ADMIN — BUDESONIDE AND FORMOTEROL FUMARATE DIHYDRATE 2 PUFF(S): 160; 4.5 AEROSOL RESPIRATORY (INHALATION) at 08:37

## 2021-06-29 RX ADMIN — Medication 1 MILLIGRAM(S): at 09:52

## 2021-06-29 RX ADMIN — HEPARIN SODIUM 5000 UNIT(S): 5000 INJECTION INTRAVENOUS; SUBCUTANEOUS at 05:41

## 2021-06-29 RX ADMIN — Medication 12.5 MILLIGRAM(S): at 09:52

## 2021-06-29 NOTE — PROGRESS NOTE ADULT - SUBJECTIVE AND OBJECTIVE BOX
Subjective:    Home Medications:  Advair Diskus 250 mcg-50 mcg inhalation powder: 1 puff(s) inhaled once a day (25 Jun 2021 12:11)  Albuterol (Eqv-ProAir HFA) 90 mcg/inh inhalation aerosol: 2 puff(s) inhaled every 6 hours, As Needed - for shortness of breath and/or wheezing (25 Jun 2021 12:11)  ALPRAZolam 1 mg oral tablet: 1 tab(s) orally 4 times a day, As Needed - for anxiety, for insomnia (25 Jun 2021 12:11)  cholecalciferol 50,000 intl units (1250 mcg) oral capsule: 1 cap(s) orally 2 times a week (25 Jun 2021 12:11)  hydroCHLOROthiazide 12.5 mg oral tablet: 1 tab(s) orally once a day (25 Jun 2021 12:11)  losartan 50 mg oral tablet: 1 tab(s) orally once a day (25 Jun 2021 12:11)  metFORMIN 500 mg oral tablet: 4 tab(s) orally once a day  ****Used for weight loss*** (25 Jun 2021 12:11)  Moderna COVID-19 Vaccine  mcg/0.5 mL intramuscular suspension: 0.5 milliliter(s) intramuscular once  ****Course Completed as of 02/21*** (25 Jun 2021 12:11)  Synthroid 112 mcg (0.112 mg) oral tablet: 1 tab(s) orally once a day (25 Jun 2021 12:11)  venlafaxine 150 mg oral capsule, extended release: 1 cap(s) orally once a day  ****Combined with 75mg for a TDD = 225mg*** (25 Jun 2021 12:11)  venlafaxine 75 mg oral tablet, extended release: 1 tab(s) orally once a day  ****Combined with 150mg for a TDD = 225mg** (25 Jun 2021 12:11)    MEDICATIONS  (STANDING):  ALBUTerol    90 MICROgram(s) HFA Inhaler 2 Puff(s) Inhalation every 6 hours  azithromycin   Tablet 250 milliGRAM(s) Oral daily  budesonide 160 MICROgram(s)/formoterol 4.5 MICROgram(s) Inhaler 2 Puff(s) Inhalation two times a day  folic acid 1 milliGRAM(s) Oral daily  heparin   Injectable 5000 Unit(s) SubCutaneous every 8 hours  hydrochlorothiazide 12.5 milliGRAM(s) Oral daily  levothyroxine 112 MICROGram(s) Oral daily  losartan 50 milliGRAM(s) Oral daily  methylPREDNISolone sodium succinate Injectable 40 milliGRAM(s) IV Push every 12 hours  nicotine - 21 mG/24Hr(s) Patch 1 patch Transdermal daily  piperacillin/tazobactam IVPB.. 3.375 Gram(s) IV Intermittent every 8 hours  senna 2 Tablet(s) Oral at bedtime  sodium chloride 0.9%. 500 milliLiter(s) (125 mL/Hr) IV Continuous <Continuous>  venlafaxine XR. 225 milliGRAM(s) Oral daily    MEDICATIONS  (PRN):  acetaminophen   Tablet .. 650 milliGRAM(s) Oral every 6 hours PRN Temp greater or equal to 38C (100.4F), Mild Pain (1 - 3)  ALPRAZolam 1 milliGRAM(s) Oral four times a day PRN for anxiety, for insomnia  LORazepam     Tablet 2 milliGRAM(s) Oral every 2 hours PRN Symptom-triggered 2 point increase in CIWA-Ar  LORazepam   Injectable 2 milliGRAM(s) IV Push every 1 hour PRN Symptom-triggered: each CIWA -Ar score 8 or GREATER  LORazepam   Injectable 2 milliGRAM(s) IV Push every 2 hours PRN CIWA-Ar score increase by 2 points and a total score of 7 or less  LORazepam   Injectable 2 milliGRAM(s) IV Push every 1 hour PRN CIWA-Ar score 8 or greater      Allergies    No Known Allergies    Intolerances        Vital Signs Last 24 Hrs  T(C): 36.4 (29 Jun 2021 05:39), Max: 36.9 (28 Jun 2021 19:31)  T(F): 97.5 (29 Jun 2021 05:39), Max: 98.5 (28 Jun 2021 19:31)  HR: 60 (29 Jun 2021 05:39) (60 - 76)  BP: 110/71 (29 Jun 2021 05:39) (96/67 - 123/78)  BP(mean): --  RR: 19 (29 Jun 2021 05:39) (16 - 19)  SpO2: 97% (29 Jun 2021 05:39) (97% - 98%)      PHYSICAL EXAMINATION:    NECK:  Supple. No lymphadenopathy. Jugular venous pressure not elevated. Carotids equal.   HEART:   The cardiac impulse has a normal quality. Reg., Nl S1 and S2.  There are no murmurs, rubs or gallops noted  CHEST:  Chest is clear to auscultation. Normal respiratory effort.  ABDOMEN:  Soft and nontender.   EXTREMITIES:  There is no edema.       LABS:                        11.8   6.95  )-----------( 385      ( 28 Jun 2021 05:47 )             35.9     06-28    136  |  103  |  10  ----------------------------<  140<H>  4.5   |  28  |  0.45<L>    Ca    9.3      28 Jun 2021 05:47  Mg     2.3     06-28                 Subjective:    pat better, sitting in bed, no new complaint. Chest xray b/l clearing of infiltrates.    Home Medications:  Advair Diskus 250 mcg-50 mcg inhalation powder: 1 puff(s) inhaled once a day (25 Jun 2021 12:11)  Albuterol (Eqv-ProAir HFA) 90 mcg/inh inhalation aerosol: 2 puff(s) inhaled every 6 hours, As Needed - for shortness of breath and/or wheezing (25 Jun 2021 12:11)  ALPRAZolam 1 mg oral tablet: 1 tab(s) orally 4 times a day, As Needed - for anxiety, for insomnia (25 Jun 2021 12:11)  cholecalciferol 50,000 intl units (1250 mcg) oral capsule: 1 cap(s) orally 2 times a week (25 Jun 2021 12:11)  hydroCHLOROthiazide 12.5 mg oral tablet: 1 tab(s) orally once a day (25 Jun 2021 12:11)  losartan 50 mg oral tablet: 1 tab(s) orally once a day (25 Jun 2021 12:11)  metFORMIN 500 mg oral tablet: 4 tab(s) orally once a day  ****Used for weight loss*** (25 Jun 2021 12:11)  Moderna COVID-19 Vaccine  mcg/0.5 mL intramuscular suspension: 0.5 milliliter(s) intramuscular once  ****Course Completed as of 02/21*** (25 Jun 2021 12:11)  Synthroid 112 mcg (0.112 mg) oral tablet: 1 tab(s) orally once a day (25 Jun 2021 12:11)  venlafaxine 150 mg oral capsule, extended release: 1 cap(s) orally once a day  ****Combined with 75mg for a TDD = 225mg*** (25 Jun 2021 12:11)  venlafaxine 75 mg oral tablet, extended release: 1 tab(s) orally once a day  ****Combined with 150mg for a TDD = 225mg** (25 Jun 2021 12:11)    MEDICATIONS  (STANDING):  ALBUTerol    90 MICROgram(s) HFA Inhaler 2 Puff(s) Inhalation every 6 hours  azithromycin   Tablet 250 milliGRAM(s) Oral daily  budesonide 160 MICROgram(s)/formoterol 4.5 MICROgram(s) Inhaler 2 Puff(s) Inhalation two times a day  folic acid 1 milliGRAM(s) Oral daily  heparin   Injectable 5000 Unit(s) SubCutaneous every 8 hours  hydrochlorothiazide 12.5 milliGRAM(s) Oral daily  levothyroxine 112 MICROGram(s) Oral daily  losartan 50 milliGRAM(s) Oral daily  methylPREDNISolone sodium succinate Injectable 40 milliGRAM(s) IV Push every 12 hours  nicotine - 21 mG/24Hr(s) Patch 1 patch Transdermal daily  piperacillin/tazobactam IVPB.. 3.375 Gram(s) IV Intermittent every 8 hours  senna 2 Tablet(s) Oral at bedtime  sodium chloride 0.9%. 500 milliLiter(s) (125 mL/Hr) IV Continuous <Continuous>  venlafaxine XR. 225 milliGRAM(s) Oral daily    MEDICATIONS  (PRN):  acetaminophen   Tablet .. 650 milliGRAM(s) Oral every 6 hours PRN Temp greater or equal to 38C (100.4F), Mild Pain (1 - 3)  ALPRAZolam 1 milliGRAM(s) Oral four times a day PRN for anxiety, for insomnia  LORazepam     Tablet 2 milliGRAM(s) Oral every 2 hours PRN Symptom-triggered 2 point increase in CIWA-Ar  LORazepam   Injectable 2 milliGRAM(s) IV Push every 1 hour PRN Symptom-triggered: each CIWA -Ar score 8 or GREATER  LORazepam   Injectable 2 milliGRAM(s) IV Push every 2 hours PRN CIWA-Ar score increase by 2 points and a total score of 7 or less  LORazepam   Injectable 2 milliGRAM(s) IV Push every 1 hour PRN CIWA-Ar score 8 or greater      Allergies    No Known Allergies    Intolerances        Vital Signs Last 24 Hrs  T(C): 36.4 (29 Jun 2021 05:39), Max: 36.9 (28 Jun 2021 19:31)  T(F): 97.5 (29 Jun 2021 05:39), Max: 98.5 (28 Jun 2021 19:31)  HR: 60 (29 Jun 2021 05:39) (60 - 76)  BP: 110/71 (29 Jun 2021 05:39) (96/67 - 123/78)  BP(mean): --  RR: 19 (29 Jun 2021 05:39) (16 - 19)  SpO2: 97% (29 Jun 2021 05:39) (97% - 98%)      PHYSICAL EXAMINATION:    NECK:  Supple. No lymphadenopathy. Jugular venous pressure not elevated. Carotids equal.   HEART:   The cardiac impulse has a normal quality. Reg., Nl S1 and S2.  There are no murmurs, rubs or gallops noted  CHEST:  Chest crackles to auscultation. Normal respiratory effort.  ABDOMEN:  Soft and nontender.   EXTREMITIES:  There is no edema.       LABS:                        11.8   6.95  )-----------( 385      ( 28 Jun 2021 05:47 )             35.9     06-28    136  |  103  |  10  ----------------------------<  140<H>  4.5   |  28  |  0.45<L>    Ca    9.3      28 Jun 2021 05:47  Mg     2.3     06-28

## 2021-06-29 NOTE — PROGRESS NOTE ADULT - ASSESSMENT
49 y/o Female with PMHx of recurrent pna, HTN, COPD, anxiety, presents to the ED with complain of progressively worsening sob and productive cough with dark green sputum x 4 days. She denies any fever at home. Denies chest discomfort, body aches, loss of smell or taste. Oxygen level upon ED arrival was 86% ORA. Patient has had recurrent pna since she was 19, last episode x1.5 years ago. Patient last saw her pulmonologist Dr. Trotter ([place]) x1.5 years ago. No recent travel. Patient reports her son recently had a cold on antibiotics, had a negative COVID test. NKDA. Patient is COVID vaccinated. Here covid (-), xray patchy lung infiltrates, CT chest with multifocal pneumonia, hilar/mediastinal LN, was given rocephin azithromycin initially then switched to IV zosyn 6/26.     1. acute respiratory failure. multifocal pneumonia. pneumonitis. COPD. alcohol abuse  - imaging reviewed, pna vs pneumonitis vs vasculitis ?  - improving   - pulmonary eval noted, on IV steroids #2   - s/p rocephin #2, completed 5 days azithromycin --> 5/29  - on IV zosyn [id number] #4  - continue with broad spectrum antibiotic coverage  - check sputum cx if able   - consider bronchoscopy if not improving  - monitor temps  - tolerating abx well so far; no side effects noted  - reason for abx use and side effects reviewed with patient  - supportive care  - fu cbc    2. other issues - care per medicine

## 2021-06-29 NOTE — PROGRESS NOTE ADULT - ASSESSMENT
PROBLEMS;    Hypoxaemic respiratory failure  Community Acquired Pneumonia-b/l pneumonia  COPD  Tobacco abuse  ETOH Abuse  Depression    PLAN:    pulmonary better  iv solumedrols 40mg q12hr  IV zosyn and Zithromax  AEROSOLS  Tobacco abuse/advised to quit smoking/Nicotine patch   ETOH Abuse-[facility] protocol  SCD for DVT ppx PROBLEMS;    Hypoxaemic respiratory failure  Community Acquired Pneumonia-b/l pneumonia  COPD  Tobacco abuse  ETOH Abuse  Depression    PLAN:    pulmonary better & improving decd planning  iv solumedrols 40mg q12hr  IV zosyn   AEROSOLS  Tobacco abuse/advised to quit smoking/Nicotine patch   ETOH Abuse-[facility] protocol  SCD for DVT ppx

## 2021-06-29 NOTE — PROGRESS NOTE ADULT - ASSESSMENT
Community Acquired Pneumonia  -s/p   IV Ceftriaxone and ZIthromax--> IV zosyn   - CT chest rev by me - shows b/l diffuse infiltrates, discussed with Pulm  - started on IV steroids, cannot r/o underlying lung disease such as ILD   home O2 eval on 6/29 - ordered   robitussin, incentive spirometry  Suspected  COPD  her last PFTs were 1.5 yrs ago and pt reports she did not have COPD then   but is on advair at home, current smoker   rec see her pulm on follow-up for repeat PFTs   c/w IV steroids   Tobacco abuse  -advised to quit smoking  -on Nicotine patch   ETOH Abuse  per discussion with patient's , she drinks 1-2 bottles of wine every night  will place on CIWA protocol  - not scoring    scoring low on CIWA at this time , will stop CIWA monitoring   Depression  on Venlafaxine   Psychiatry consult given depression and etoh abuse \  6/26 - rec taper xanax as OP; pt to speak to her psychiatrist about etoh use   pt seen by behavioral health team here   Insomnia - melatonin  VTE Px - SCDs, HEP SC     Code status: Patient is Full code    dc planning - dc home wed

## 2021-06-29 NOTE — PROGRESS NOTE ADULT - SUBJECTIVE AND OBJECTIVE BOX
CHIEF COMPLAINT:49 y/o Female with PMHx of recurrent pna, HTN, COPD, anxiety, presents to the ED with complain of progressively worsening sob and productive cough with dark green sputum x 4 days. She denies any fever at home. Denies chest discomfort, body aches, loss of smell or taste.Oxygen level upon ED arrival was 86% ORA. Patient has had recurrent pna since she was 19, last episode x1.5 years ago. Patient last saw her pulmonologist Dr. Trotter ([place]) x1.5 years ago. No recent travel. Patient reports her son recently had a cold on antibiotics, had a negative COVID test. NKDA. Patient is COVID vaccinated. No other complain.    6/25 - no cp palps sob; has been coughing; ambulating, O2 is new for her   6/26 - no cp palps sob; cough improving   6/27 - no cp palps; o2 sats dropped on ambulation, no other complaints   6/28 - no cp palps sob at rest; no abdo pain; is constipated   6/29 - pt seen and examined, afebrile, + productive cough, + poor sleep, denies cp, abdominal pain, plan discussed  Review of system- Rest of the review of system are negative except mentioned in HPI    Vital Signs Last 24 Hrs  T(C): 36.4 (06-29-21 @ 17:05), Max: 36.8 (06-29-21 @ 00:13)  T(F): 97.6 (06-29-21 @ 17:05), Max: 98.2 (06-29-21 @ 00:13)  HR: 64 (06-29-21 @ 17:05) (55 - 80)  BP: 114/76 (06-29-21 @ 17:05) (109/72 - 123/78)  RR: 18 (06-29-21 @ 17:05) (16 - 20)  SpO2: 97% (06-29-21 @ 17:05) (97% - 98%)  Wt(kg): --      PHYSICAL EXAM:  Constitutional: NAD, awake and alert, well-developed  HEENT: PERR, EOMI  Neck: Soft and supple,  No JVD  Respiratory: few rhonchi; on NC   Cardiovascular: S1 and S2, regular rate and rhythm, no Murmurs  Gastrointestinal: Bowel Sounds present, soft, nontender, nondistended  Extremities: No peripheral edema  Vascular: 2+ peripheral pulses  Neurological: A/O x 3, no focal deficits  Musculoskeletal: 5/5 strength b/l upper and lower extremities  Skin: No rashes    RADIOLOGY/EKG:    < from: Xray Chest 1 View- PORTABLE-Urgent (06.24.21 @ 18:18) >  Patchy infiltrates. Differential diagnosis includes Covid-19 pneumonia among other possibilities.    < from: CT Chest No Cont (06.26.21 @ 18:08) >  IMPRESSION:  Diffuse bilateral pneumonia and lymphadenopathy.      LABS: All Labs Reviewed:                          11.8   6.95  )-----------( 385      ( 28 Jun 2021 05:47 )             35.9     136  |  103  |  10  ----------------------------<  140<H>  4.5   |  28  |  0.45<L>    Ca    9.3      28 Jun 2021 05:47  Mg     2.3     06-28    MEDS:   acetaminophen   Tablet .. 650 milliGRAM(s) Oral every 6 hours PRN  ALBUTerol    90 MICROgram(s) HFA Inhaler 2 Puff(s) Inhalation every 6 hours  ALPRAZolam 1 milliGRAM(s) Oral four times a day PRN  azithromycin   Tablet 250 milliGRAM(s) Oral daily  budesonide 160 MICROgram(s)/formoterol 4.5 MICROgram(s) Inhaler 2 Puff(s) Inhalation two times a day  folic acid 1 milliGRAM(s) Oral daily  heparin   Injectable 5000 Unit(s) SubCutaneous every 8 hours  hydrochlorothiazide 12.5 milliGRAM(s) Oral daily  levothyroxine 112 MICROGram(s) Oral daily  LORazepam     Tablet 2 milliGRAM(s) Oral every 2 hours PRN  LORazepam   Injectable 2 milliGRAM(s) IV Push every 1 hour PRN  LORazepam   Injectable 2 milliGRAM(s) IV Push every 2 hours PRN  LORazepam   Injectable 2 milliGRAM(s) IV Push every 1 hour PRN  losartan 50 milliGRAM(s) Oral daily  methylPREDNISolone sodium succinate Injectable 40 milliGRAM(s) IV Push every 12 hours  nicotine - 21 mG/24Hr(s) Patch 1 patch Transdermal daily  piperacillin/tazobactam IVPB.. 3.375 Gram(s) IV Intermittent every 8 hours  sodium chloride 0.9%. 500 milliLiter(s) IV Continuous <Continuous>  venlafaxine XR. 225 milliGRAM(s) Oral daily

## 2021-06-29 NOTE — PROGRESS NOTE ADULT - SUBJECTIVE AND OBJECTIVE BOX
Date of service: 06-29-21 @ 10:41    pt seen and examined  feels better this am  bringing up sputum  on RA  temps down  feeling better now    ROS: no fever or chills; denies dizziness, no HA, no abdominal pain, no diarrhea or constipation; no dysuria, no urinary frequency, no legs pain, no rashes    MEDICATIONS  (STANDING):  ALBUTerol    90 MICROgram(s) HFA Inhaler 2 Puff(s) Inhalation every 6 hours  budesonide 160 MICROgram(s)/formoterol 4.5 MICROgram(s) Inhaler 2 Puff(s) Inhalation two times a day  folic acid 1 milliGRAM(s) Oral daily  heparin   Injectable 5000 Unit(s) SubCutaneous every 8 hours  hydrochlorothiazide 12.5 milliGRAM(s) Oral daily  levothyroxine 112 MICROGram(s) Oral daily  losartan 50 milliGRAM(s) Oral daily  methylPREDNISolone sodium succinate Injectable 40 milliGRAM(s) IV Push every 12 hours  nicotine - 21 mG/24Hr(s) Patch 1 patch Transdermal daily  piperacillin/tazobactam IVPB.. 3.375 Gram(s) IV Intermittent every 8 hours  senna 2 Tablet(s) Oral at bedtime  sodium chloride 0.9%. 500 milliLiter(s) (125 mL/Hr) IV Continuous <Continuous>  venlafaxine XR. 225 milliGRAM(s) Oral daily    Vital Signs Last 24 Hrs  T(C): 36.3 (29 Jun 2021 08:07), Max: 36.9 (28 Jun 2021 19:31)  T(F): 97.3 (29 Jun 2021 08:07), Max: 98.5 (28 Jun 2021 19:31)  HR: 55 (29 Jun 2021 08:07) (55 - 76)  BP: 109/72 (29 Jun 2021 08:07) (100/66 - 123/78)  BP(mean): --  RR: 16 (29 Jun 2021 08:07) (16 - 19)  SpO2: 98% (29 Jun 2021 08:07) (97% - 98%)    PE:  Constitutional: frail looking  HEENT: NC/AT, EOMI, PERRLA, conjunctivae clear; ears and nose atraumatic; pharynx benign  Neck: supple; thyroid not palpable  Back: no tenderness  Respiratory: decreased breath sounds, rhonchi  Cardiovascular: S1S2 regular, no murmurs  Abdomen: soft, not tender, not distended, positive BS; liver and spleen WNL  Genitourinary: no suprapubic tenderness  Lymphatic: no LN palpable  Musculoskeletal: no muscle tenderness, no joint swelling or tenderness  Extremities: no pedal edema  Neurological/ Psychiatric: AxOx3, Judgement and insight normal;  moving all extremities  Skin: no rashes; no palpable lesions    Labs: all available labs reviewed                                              11.8   6.95  )-----------( 385      ( 28 Jun 2021 05:47 )             35.9     06-28    136  |  103  |  10  ----------------------------<  140<H>  4.5   |  28  |  0.45<L>    Ca    9.3      28 Jun 2021 05:47  Mg     2.3     06-28        Radiology: all available radiological tests reviewed      CT chest    INTERPRETATION:  [facility] RADIOLOGIST PRELIMINARY REPORT    PROCEDURE INFORMATION:  Exam: CT Chest Without Contrast; Diagnostic  Exam date and time: 6/26/2021 6:00 PM  Age: 50 years old  Clinical indication: Fever    TECHNIQUE:  Imaging protocol: Diagnostic computed tomographyof the chest without contrast.    COMPARISON:  CR XR CHEST URGENT 6/24/2021 6:06 PM    FINDINGS:  Lungs: Diffuse infiltrates and multifocal airspace disease throughout both  lungs indicating pneumonia.  Pleural spaces: No pneumothorax.  Heart: Unremarkable.  Aorta: No aneurysm.  Lymph nodes: Hilar and mediastinal lymphadenopathy.    Bones/joints: Unremarkable.  Soft tissues: Unremarkable.    IMPRESSION:  Diffuse bilateral pneumonia and lymphadenopathy.        Advanced directives addressed: full resuscitation

## 2021-06-30 ENCOUNTER — TRANSCRIPTION ENCOUNTER (OUTPATIENT)
Age: 51
End: 2021-06-30

## 2021-06-30 VITALS
DIASTOLIC BLOOD PRESSURE: 79 MMHG | HEART RATE: 60 BPM | RESPIRATION RATE: 17 BRPM | OXYGEN SATURATION: 99 % | TEMPERATURE: 98 F | SYSTOLIC BLOOD PRESSURE: 121 MMHG

## 2021-06-30 DIAGNOSIS — F32.9 MAJOR DEPRESSIVE DISORDER, SINGLE EPISODE, UNSPECIFIED: ICD-10-CM

## 2021-06-30 LAB
24R-OH-CALCIDIOL SERPL-MCNC: 64.7 NG/ML — SIGNIFICANT CHANGE UP (ref 30–80)
ALBUMIN SERPL ELPH-MCNC: 2.6 G/DL — LOW (ref 3.3–5)
ALP SERPL-CCNC: 101 U/L — SIGNIFICANT CHANGE UP (ref 40–120)
ALT FLD-CCNC: 68 U/L — SIGNIFICANT CHANGE UP (ref 12–78)
ANION GAP SERPL CALC-SCNC: 6 MMOL/L — SIGNIFICANT CHANGE UP (ref 5–17)
AST SERPL-CCNC: 33 U/L — SIGNIFICANT CHANGE UP (ref 15–37)
BASOPHILS # BLD AUTO: 0.02 K/UL — SIGNIFICANT CHANGE UP (ref 0–0.2)
BASOPHILS NFR BLD AUTO: 0.2 % — SIGNIFICANT CHANGE UP (ref 0–2)
BILIRUB SERPL-MCNC: 0.1 MG/DL — LOW (ref 0.2–1.2)
BUN SERPL-MCNC: 16 MG/DL — SIGNIFICANT CHANGE UP (ref 7–23)
CALCIUM SERPL-MCNC: 9.5 MG/DL — SIGNIFICANT CHANGE UP (ref 8.5–10.1)
CHLORIDE SERPL-SCNC: 103 MMOL/L — SIGNIFICANT CHANGE UP (ref 96–108)
CO2 SERPL-SCNC: 27 MMOL/L — SIGNIFICANT CHANGE UP (ref 22–31)
CREAT SERPL-MCNC: 0.51 MG/DL — SIGNIFICANT CHANGE UP (ref 0.5–1.3)
CRP SERPL-MCNC: 46 MG/L — HIGH
CRP SERPL-MCNC: 71 MG/L — HIGH
EOSINOPHIL # BLD AUTO: 0.01 K/UL — SIGNIFICANT CHANGE UP (ref 0–0.5)
EOSINOPHIL NFR BLD AUTO: 0.1 % — SIGNIFICANT CHANGE UP (ref 0–6)
ERYTHROCYTE [SEDIMENTATION RATE] IN BLOOD: 95 MM/HR — HIGH (ref 0–20)
GLUCOSE SERPL-MCNC: 122 MG/DL — HIGH (ref 70–99)
GRAM STN FLD: SIGNIFICANT CHANGE UP
HCT VFR BLD CALC: 36.2 % — SIGNIFICANT CHANGE UP (ref 34.5–45)
HGB BLD-MCNC: 11.6 G/DL — SIGNIFICANT CHANGE UP (ref 11.5–15.5)
IMM GRANULOCYTES NFR BLD AUTO: 0.6 % — SIGNIFICANT CHANGE UP (ref 0–1.5)
LDH SERPL L TO P-CCNC: 210 U/L — SIGNIFICANT CHANGE UP (ref 84–241)
LYMPHOCYTES # BLD AUTO: 1.91 K/UL — SIGNIFICANT CHANGE UP (ref 1–3.3)
LYMPHOCYTES # BLD AUTO: 22.9 % — SIGNIFICANT CHANGE UP (ref 13–44)
MCHC RBC-ENTMCNC: 31.9 PG — SIGNIFICANT CHANGE UP (ref 27–34)
MCHC RBC-ENTMCNC: 32 GM/DL — SIGNIFICANT CHANGE UP (ref 32–36)
MCV RBC AUTO: 99.5 FL — SIGNIFICANT CHANGE UP (ref 80–100)
MONOCYTES # BLD AUTO: 0.42 K/UL — SIGNIFICANT CHANGE UP (ref 0–0.9)
MONOCYTES NFR BLD AUTO: 5 % — SIGNIFICANT CHANGE UP (ref 2–14)
NEUTROPHILS # BLD AUTO: 5.92 K/UL — SIGNIFICANT CHANGE UP (ref 1.8–7.4)
NEUTROPHILS NFR BLD AUTO: 71.2 % — SIGNIFICANT CHANGE UP (ref 43–77)
PLATELET # BLD AUTO: 462 K/UL — HIGH (ref 150–400)
POTASSIUM SERPL-MCNC: 4.4 MMOL/L — SIGNIFICANT CHANGE UP (ref 3.5–5.3)
POTASSIUM SERPL-SCNC: 4.4 MMOL/L — SIGNIFICANT CHANGE UP (ref 3.5–5.3)
PROT SERPL-MCNC: 7.8 GM/DL — SIGNIFICANT CHANGE UP (ref 6–8.3)
RBC # BLD: 3.64 M/UL — LOW (ref 3.8–5.2)
RBC # FLD: 12.3 % — SIGNIFICANT CHANGE UP (ref 10.3–14.5)
SODIUM SERPL-SCNC: 136 MMOL/L — SIGNIFICANT CHANGE UP (ref 135–145)
SPECIMEN SOURCE: SIGNIFICANT CHANGE UP
WBC # BLD: 8.33 K/UL — SIGNIFICANT CHANGE UP (ref 3.8–10.5)
WBC # FLD AUTO: 8.33 K/UL — SIGNIFICANT CHANGE UP (ref 3.8–10.5)

## 2021-06-30 PROCEDURE — 99239 HOSP IP/OBS DSCHRG MGMT >30: CPT

## 2021-06-30 PROCEDURE — 99232 SBSQ HOSP IP/OBS MODERATE 35: CPT

## 2021-06-30 RX ORDER — ALPRAZOLAM 0.25 MG
1 TABLET ORAL
Qty: 0 | Refills: 0 | DISCHARGE

## 2021-06-30 RX ORDER — NICOTINE POLACRILEX 2 MG
1 GUM BUCCAL
Qty: 14 | Refills: 0
Start: 2021-06-30 | End: 2021-07-13

## 2021-06-30 RX ORDER — CHOLECALCIFEROL (VITAMIN D3) 125 MCG
1 CAPSULE ORAL
Qty: 0 | Refills: 0 | DISCHARGE

## 2021-06-30 RX ORDER — CEFUROXIME AXETIL 250 MG
1 TABLET ORAL
Qty: 10 | Refills: 0
Start: 2021-06-30 | End: 2021-07-04

## 2021-06-30 RX ORDER — LANOLIN ALCOHOL/MO/W.PET/CERES
1 CREAM (GRAM) TOPICAL
Qty: 30 | Refills: 0
Start: 2021-06-30 | End: 2021-07-29

## 2021-06-30 RX ORDER — CX-024414 0.2 MG/ML
0.5 INJECTION, SUSPENSION INTRAMUSCULAR
Qty: 0 | Refills: 0 | DISCHARGE

## 2021-06-30 RX ORDER — CEFUROXIME AXETIL 250 MG
500 TABLET ORAL EVERY 12 HOURS
Refills: 0 | Status: DISCONTINUED | OUTPATIENT
Start: 2021-06-30 | End: 2021-06-30

## 2021-06-30 RX ADMIN — BUDESONIDE AND FORMOTEROL FUMARATE DIHYDRATE 2 PUFF(S): 160; 4.5 AEROSOL RESPIRATORY (INHALATION) at 09:07

## 2021-06-30 RX ADMIN — Medication 225 MILLIGRAM(S): at 09:02

## 2021-06-30 RX ADMIN — PIPERACILLIN AND TAZOBACTAM 25 GRAM(S): 4; .5 INJECTION, POWDER, LYOPHILIZED, FOR SOLUTION INTRAVENOUS at 06:41

## 2021-06-30 RX ADMIN — Medication 40 MILLIGRAM(S): at 13:31

## 2021-06-30 RX ADMIN — Medication 500 MILLIGRAM(S): at 13:31

## 2021-06-30 RX ADMIN — HEPARIN SODIUM 5000 UNIT(S): 5000 INJECTION INTRAVENOUS; SUBCUTANEOUS at 06:41

## 2021-06-30 RX ADMIN — HEPARIN SODIUM 5000 UNIT(S): 5000 INJECTION INTRAVENOUS; SUBCUTANEOUS at 13:31

## 2021-06-30 RX ADMIN — Medication 112 MICROGRAM(S): at 06:41

## 2021-06-30 NOTE — PROGRESS NOTE ADULT - NSICDXPILOT_GEN_ALL_CORE
[place]

## 2021-06-30 NOTE — PROGRESS NOTE BEHAVIORAL HEALTH - NSBHCHARTREVIEWVS_PSY_A_CORE FT
Vital Signs Last 24 Hrs  T(C): 36.2 (30 Jun 2021 07:53), Max: 37.2 (29 Jun 2021 22:25)  T(F): 97.1 (30 Jun 2021 07:53), Max: 98.9 (29 Jun 2021 22:25)  HR: 51 (30 Jun 2021 07:53) (51 - 72)  BP: 102/84 (30 Jun 2021 07:53) (102/84 - 114/76)  BP(mean): --  RR: 16 (30 Jun 2021 07:53) (16 - 19)  SpO2: 94% (30 Jun 2021 07:53) (94% - 97%)

## 2021-06-30 NOTE — PROGRESS NOTE ADULT - ASSESSMENT
51 y/o Female with PMHx of recurrent pna, HTN, COPD, anxiety, presents to the ED with complain of progressively worsening sob and productive cough with dark green sputum x 4 days. She denies any fever at home. Denies chest discomfort, body aches, loss of smell or taste. Oxygen level upon ED arrival was 86% ORA. Patient has had recurrent pna since she was 19, last episode x1.5 years ago. Patient last saw her pulmonologist Dr. Trotter ([place]) x1.5 years ago. No recent travel. Patient reports her son recently had a cold on antibiotics, had a negative COVID test. NKDA. Patient is COVID vaccinated. Here covid (-), xray patchy lung infiltrates, CT chest with multifocal pneumonia, hilar/mediastinal LN, was given rocephin azithromycin initially then switched to IV zosyn 6/26.     1. acute respiratory failure. multifocal pneumonia. pneumonitis. COPD. alcohol abuse  - imaging reviewed  - improving   - pulmonary eval noted, on IV steroids #3  - s/p rocephin #2, completed 5 days azithromycin --> 5/29  - on IV zosyn 3.375q8h #5  - continue with broad spectrum antibiotic coverage  - f/u sputum cx   - consider bronchoscopy if not improving  - monitor temps  - tolerating abx well so far; no side effects noted  - reason for abx use and side effects reviewed with patient  - on dc plan for po augmentin complete 5 more days   - supportive care  - fu cbc    2. other issues - care per medicine  51 y/o Female with PMHx of recurrent pna, HTN, COPD, anxiety, presents to the ED with complain of progressively worsening sob and productive cough with dark green sputum x 4 days. She denies any fever at home. Denies chest discomfort, body aches, loss of smell or taste. Oxygen level upon ED arrival was 86% ORA. Patient has had recurrent pna since she was 19, last episode x1.5 years ago. Patient last saw her pulmonologist Dr. Trotter ([place]) x1.5 years ago. No recent travel. Patient reports her son recently had a cold on antibiotics, had a negative COVID test. NKDA. Patient is COVID vaccinated. Here covid (-), xray patchy lung infiltrates, CT chest with multifocal pneumonia, hilar/mediastinal LN, was given rocephin azithromycin initially then switched to IV zosyn 6/26.     1. acute respiratory failure. multifocal pneumonia. pneumonitis. COPD. alcohol abuse  - imaging reviewed  - improving   - pulmonary eval noted, on IV steroids #3  - s/p rocephin #2, completed 5 days azithromycin --> 5/29  - on IV zosyn 3.375q8h #5  - continue with broad spectrum antibiotic coverage  - f/u sputum cx   - consider bronchoscopy if not improving  - monitor temps  - tolerating abx well so far; no side effects noted  - reason for abx use and side effects reviewed with patient  - on dc plan for po ceftin 500mg BID complete 5 more days   - supportive care  - fu cbc    2. other issues - care per medicine  51 y/o Female with PMHx of recurrent pna, HTN, COPD, anxiety, presents to the ED with complain of progressively worsening sob and productive cough with dark green sputum x 4 days. She denies any fever at home. Denies chest discomfort, body aches, loss of smell or taste. Oxygen level upon ED arrival was 86% ORA. Patient has had recurrent pna since she was 19, last episode x1.5 years ago. Patient last saw her pulmonologist Dr. Trotter ([place]) x1.5 years ago. No recent travel. Patient reports her son recently had a cold on antibiotics, had a negative COVID test. NKDA. Patient is COVID vaccinated. Here covid (-), xray patchy lung infiltrates, CT chest with multifocal pneumonia, hilar/mediastinal LN, was given rocephin azithromycin initially then switched to IV zosyn 6/26.     1. acute respiratory failure. multifocal pneumonia. pneumonitis. COPD. alcohol abuse  - imaging reviewed  - improving   - pulmonary eval noted, on IV steroids #3  - s/p rocephin #2, completed 5 days azithromycin --> 5/29  - on IV zosyn 3.375q8h #5  - continue with broad spectrum antibiotic coverage  - f/u sputum cx   - consider bronchoscopy if not improving  - monitor temps  - tolerating abx well so far; no side effects noted  - reason for abx use and side effects reviewed with patient  - if plan for dc - can dc with po ceftin 500mg BID complete 5 more days   - will follow up final cx and adjust abx as needed  - supportive care  - fu cbc    2. other issues - care per medicine

## 2021-06-30 NOTE — DISCHARGE NOTE NURSING/CASE MANAGEMENT/SOCIAL WORK - PATIENT PORTAL LINK FT
You can access the FollowMyHealth Patient Portal offered by [facility] by registering at the following website: [email or web address]. By joining [facility]’s FollowMyHealth portal, you will also be able to view your health information using other applications (apps) compatible with our system.

## 2021-06-30 NOTE — DISCHARGE NOTE PROVIDER - NSDCMRMEDTOKEN_GEN_ALL_CORE_FT
[Safe Sexual Practices] : safe sexual practices
Advair Diskus 250 mcg-50 mcg inhalation powder: 1 puff(s) inhaled once a day  Albuterol (Eqv-ProAir HFA) 90 mcg/inh inhalation aerosol: 2 puff(s) inhaled every 6 hours, As Needed - for shortness of breath and/or wheezing  ALPRAZolam 1 mg oral tablet: 1 tab(s) orally 4 times a day, As Needed - for anxiety, for insomnia  consider taper and use melatonin instead  cefuroxime 500 mg oral tablet: 1 tab(s) orally every 12 hours  cholecalciferol 50,000 intl units (1250 mcg) oral capsule: 1 cap(s) orally every 7 days  hydroCHLOROthiazide 12.5 mg oral tablet: 1 tab(s) orally once a day  losartan 50 mg oral tablet: 1 tab(s) orally once a day  melatonin 5 mg oral tablet: 1 tab(s) orally once a day (at bedtime)  metFORMIN 500 mg oral tablet: 4 tab(s) orally once a day  ****Used for weight loss***  Mucinex 600 mg oral tablet, extended release: 1 tab(s) orally 2 times a day   nicotine 21 mg/24 hr transdermal film, extended release: 1 patch transdermal once a day   predniSONE 10 mg oral tablet: 4 tab(s) orally once a day x 2 days  than 3 tabs daily for 2 days  than 2 tabs daily for 2 days  than 1 tab daily for 2 day  Synthroid 112 mcg (0.112 mg) oral tablet: 1 tab(s) orally once a day  venlafaxine 150 mg oral capsule, extended release: 1 cap(s) orally once a day  ****Combined with 75mg for a TDD = 225mg***  venlafaxine 75 mg oral tablet, extended release: 1 tab(s) orally once a day  ****Combined with 150mg for a TDD = 225mg**

## 2021-06-30 NOTE — PROGRESS NOTE ADULT - PROVIDER SPECIALTY LIST ADULT
Pulmonology
Hospitalist
Hospitalist
Infectious Disease
Hospitalist
Infectious Disease
Infectious Disease
Pulmonology
Pulmonology
Hospitalist
Hospitalist
Pulmonology

## 2021-06-30 NOTE — PROGRESS NOTE ADULT - SUBJECTIVE AND OBJECTIVE BOX
Subjective:    Home Medications:  Advair Diskus 250 mcg-50 mcg inhalation powder: 1 puff(s) inhaled once a day (25 Jun 2021 12:11)  Albuterol (Eqv-ProAir HFA) 90 mcg/inh inhalation aerosol: 2 puff(s) inhaled every 6 hours, As Needed - for shortness of breath and/or wheezing (25 Jun 2021 12:11)  ALPRAZolam 1 mg oral tablet: 1 tab(s) orally 4 times a day, As Needed - for anxiety, for insomnia  consider taper and use melatonin instead (30 Jun 2021 14:53)  cholecalciferol 50,000 intl units (1250 mcg) oral capsule: 1 cap(s) orally every 7 days (30 Jun 2021 14:53)  hydroCHLOROthiazide 12.5 mg oral tablet: 1 tab(s) orally once a day (25 Jun 2021 12:11)  losartan 50 mg oral tablet: 1 tab(s) orally once a day (25 Jun 2021 12:11)  metFORMIN 500 mg oral tablet: 4 tab(s) orally once a day  ****Used for weight loss*** (25 Jun 2021 12:11)  Synthroid 112 mcg (0.112 mg) oral tablet: 1 tab(s) orally once a day (25 Jun 2021 12:11)  venlafaxine 150 mg oral capsule, extended release: 1 cap(s) orally once a day  ****Combined with 75mg for a TDD = 225mg*** (25 Jun 2021 12:11)  venlafaxine 75 mg oral tablet, extended release: 1 tab(s) orally once a day  ****Combined with 150mg for a TDD = 225mg** (25 Jun 2021 12:11)    MEDICATIONS  (STANDING):  ALBUTerol    90 MICROgram(s) HFA Inhaler 2 Puff(s) Inhalation every 6 hours  budesonide 160 MICROgram(s)/formoterol 4.5 MICROgram(s) Inhaler 2 Puff(s) Inhalation two times a day  cefuroxime   Tablet 500 milliGRAM(s) Oral every 12 hours  heparin   Injectable 5000 Unit(s) SubCutaneous every 8 hours  hydrochlorothiazide 12.5 milliGRAM(s) Oral daily  levothyroxine 112 MICROGram(s) Oral daily  losartan 50 milliGRAM(s) Oral daily  melatonin 5 milliGRAM(s) Oral at bedtime  nicotine - 21 mG/24Hr(s) Patch 1 patch Transdermal daily  predniSONE   Tablet 40 milliGRAM(s) Oral daily  senna 2 Tablet(s) Oral at bedtime  sodium chloride 0.9%. 500 milliLiter(s) (125 mL/Hr) IV Continuous <Continuous>  venlafaxine XR. 225 milliGRAM(s) Oral daily    MEDICATIONS  (PRN):  acetaminophen   Tablet .. 650 milliGRAM(s) Oral every 6 hours PRN Temp greater or equal to 38C (100.4F), Mild Pain (1 - 3)  ALPRAZolam 1 milliGRAM(s) Oral four times a day PRN for anxiety, for insomnia  guaiFENesin Oral Liquid (Sugar-Free) 200 milliGRAM(s) Oral every 6 hours PRN Cough      Allergies    No Known Allergies    Intolerances        Vital Signs Last 24 Hrs  T(C): 36.7 (30 Jun 2021 16:35), Max: 37.2 (29 Jun 2021 22:25)  T(F): 98 (30 Jun 2021 16:35), Max: 98.9 (29 Jun 2021 22:25)  HR: 60 (30 Jun 2021 16:35) (51 - 72)  BP: 121/79 (30 Jun 2021 16:35) (102/84 - 121/79)  BP(mean): --  RR: 17 (30 Jun 2021 16:35) (16 - 19)  SpO2: 99% (30 Jun 2021 16:35) (94% - 99%)      PHYSICAL EXAMINATION:    NECK:  Supple. No lymphadenopathy. Jugular venous pressure not elevated. Carotids equal.   HEART:   The cardiac impulse has a normal quality. Reg., Nl S1 and S2.  There are no murmurs, rubs or gallops noted  CHEST:  Chest is clear to auscultation. Normal respiratory effort.  ABDOMEN:  Soft and nontender.   EXTREMITIES:  There is no edema.       LABS:                        11.6   8.33  )-----------( 462      ( 30 Jun 2021 06:03 )             36.2     06-30    136  |  103  |  16  ----------------------------<  122<H>  4.4   |  27  |  0.51    Ca    9.5      30 Jun 2021 06:03    TPro  7.8  /  Alb  2.6<L>  /  TBili  0.1<L>  /  DBili  x   /  AST  33  /  ALT  68  /  AlkPhos  101  06-30          Culture - Sputum (collected 06-30-21 @ 05:14)  Source: .Sputum Sputum  Gram Stain (06-30-21 @ 11:43):    Few polymorphonuclear leukocytes per low power field    Few Squamous epithelial cells per low power field    Few Gram positive cocci in pairs Gram Positive Cocci in Clusters per oil    power field    Few Gram Negative Rods per oil power field

## 2021-06-30 NOTE — DISCHARGE NOTE PROVIDER - NSDCCPCAREPLAN_GEN_ALL_CORE_FT
PRINCIPAL DISCHARGE DIAGNOSIS  Diagnosis: Pneumonia of both lower lobes due to infectious organism  Assessment and Plan of Treatment: take steroids taper and antibiotics, follow up with pulmonologist dr. Dodson contact information included, consider follow up immaging studies within 3-4 weeks to establish resolution of the pneumonia   return to ED if fever, worsening of cough or dyspnea      SECONDARY DISCHARGE DIAGNOSES  Diagnosis: COPD (chronic obstructive pulmonary disease)  Assessment and Plan of Treatment: suspected COPD, avoid smoking, use nicotine patch, follow up with pulmonologist for further monitoring    Diagnosis: Insomnia  Assessment and Plan of Treatment: take melatonin daily, taper off xanax as outpatient with PCP or psychiatrist monitoring

## 2021-06-30 NOTE — DISCHARGE NOTE PROVIDER - CARE PROVIDER_API CALL
Chris Dodson)  Critical Care Medicine; Internal Medicine; Pulmonary Disease; Sleep Medicine  [address] [place] [address]  Phone: [phone]  Fax: [phone]  Follow Up Time: 1 week    PCP,   Phone: (   )    -  Fax: (   )    -  Follow Up Time: 1 week

## 2021-06-30 NOTE — PROGRESS NOTE ADULT - SUBJECTIVE AND OBJECTIVE BOX
Date of service: 06-30-21 @ 12:13    pt seen and examined  feels better this am  afebrile  less cough  on RA  wants go home     ROS: no fever or chills; denies dizziness, no HA, no abdominal pain, no diarrhea or constipation; no dysuria, no urinary frequency, no legs pain, no rashes    MEDICATIONS  (STANDING):  ALBUTerol    90 MICROgram(s) HFA Inhaler 2 Puff(s) Inhalation every 6 hours  budesonide 160 MICROgram(s)/formoterol 4.5 MICROgram(s) Inhaler 2 Puff(s) Inhalation two times a day  heparin   Injectable 5000 Unit(s) SubCutaneous every 8 hours  hydrochlorothiazide 12.5 milliGRAM(s) Oral daily  levothyroxine 112 MICROGram(s) Oral daily  losartan 50 milliGRAM(s) Oral daily  melatonin 5 milliGRAM(s) Oral at bedtime  methylPREDNISolone sodium succinate Injectable 40 milliGRAM(s) IV Push every 12 hours  nicotine - 21 mG/24Hr(s) Patch 1 patch Transdermal daily  piperacillin/tazobactam IVPB.. 3.375 Gram(s) IV Intermittent every 8 hours  senna 2 Tablet(s) Oral at bedtime  sodium chloride 0.9%. 500 milliLiter(s) (125 mL/Hr) IV Continuous <Continuous>  venlafaxine XR. 225 milliGRAM(s) Oral daily    Vital Signs Last 24 Hrs  T(C): 36.2 (30 Jun 2021 07:53), Max: 37.2 (29 Jun 2021 22:25)  T(F): 97.1 (30 Jun 2021 07:53), Max: 98.9 (29 Jun 2021 22:25)  HR: 51 (30 Jun 2021 07:53) (51 - 72)  BP: 102/84 (30 Jun 2021 07:53) (102/84 - 114/76)  BP(mean): --  RR: 16 (30 Jun 2021 07:53) (16 - 20)  SpO2: 94% (30 Jun 2021 07:53) (94% - 98%)      PE:  Constitutional: frail looking  HEENT: NC/AT, EOMI, PERRLA, conjunctivae clear; ears and nose atraumatic; pharynx benign  Neck: supple; thyroid not palpable  Back: no tenderness  Respiratory: decreased breath sounds, rhonchi  Cardiovascular: S1S2 regular, no murmurs  Abdomen: soft, not tender, not distended, positive BS; liver and spleen WNL  Genitourinary: no suprapubic tenderness  Lymphatic: no LN palpable  Musculoskeletal: no muscle tenderness, no joint swelling or tenderness  Extremities: no pedal edema  Neurological/ Psychiatric: AxOx3, Judgement and insight normal;  moving all extremities  Skin: no rashes; no palpable lesions    Labs: all available labs reviewed                                                         11.6   8.33  )-----------( 462      ( 30 Jun 2021 06:03 )             36.2     06-30    136  |  103  |  16  ----------------------------<  122<H>  4.4   |  27  |  0.51    Ca    9.5      30 Jun 2021 06:03    TPro  7.8  /  Alb  2.6<L>  /  TBili  0.1<L>  /  DBili  x   /  AST  33  /  ALT  68  /  AlkPhos  101  06-30    Culture - Sputum . (06.30.21 @ 05:14)   Gram Stain:   Few polymorphonuclear leukocytes per low power field   Few Squamous epithelial cells per low power field   Few Gram positive cocci in pairs Gram Positive Cocci in Clusters per oil   power field   Few Gram Negative Rods per oil power field   Specimen Source: .Sputum Sputum Culture - Blood (06.24.21 @ 18:39)   Specimen Source: .Blood None   Culture Results:   No Growth Final     Radiology: all available radiological tests reviewed      CT chest    INTERPRETATION:  [facility] RADIOLOGIST PRELIMINARY REPORT    PROCEDURE INFORMATION:  Exam: CT Chest Without Contrast; Diagnostic  Exam date and time: 6/26/2021 6:00 PM  Age: 50 years old  Clinical indication: Fever    TECHNIQUE:  Imaging protocol: Diagnostic computed tomographyof the chest without contrast.    COMPARISON:  CR XR CHEST URGENT 6/24/2021 6:06 PM    FINDINGS:  Lungs: Diffuse infiltrates and multifocal airspace disease throughout both  lungs indicating pneumonia.  Pleural spaces: No pneumothorax.  Heart: Unremarkable.  Aorta: No aneurysm.  Lymph nodes: Hilar and mediastinal lymphadenopathy.    Bones/joints: Unremarkable.  Soft tissues: Unremarkable.    IMPRESSION:  Diffuse bilateral pneumonia and lymphadenopathy.        Advanced directives addressed: full resuscitation
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
## 2021-06-30 NOTE — DISCHARGE NOTE PROVIDER - HOSPITAL COURSE
CHIEF COMPLAINT:51 y/o Female with PMHx of recurrent pna, HTN, COPD, anxiety, presents to the ED with complain of progressively worsening sob and productive cough with dark green sputum x 4 days. She denies any fever at home. Denies chest discomfort, body aches, loss of smell or taste.Oxygen level upon ED arrival was 86% ORA. Patient has had recurrent pna since she was 19, last episode x1.5 years ago. Patient last saw her pulmonologist Dr. Trotter ([place]) x1.5 years ago. No recent travel. Patient reports her son recently had a cold on antibiotics, had a negative COVID test. NKDA. Patient is COVID vaccinated. No other complain.    6/25 - no cp palps sob; has been coughing; ambulating, O2 is new for her   6/26 - no cp palps sob; cough improving   6/27 - no cp palps; o2 sats dropped on ambulation, no other complaints   6/28 - no cp palps sob at rest; no abdo pain; is constipated   6/29 - pt seen and examined, afebrile, + productive cough, + poor sleep, denies cp, abdominal pain, plan discussed  6/30 - pt seen and examined, afebrile, cough improving , denies cp, abdominal pain, eager to go home     Review of system- Rest of the review of system are negative except mentioned in HPI    Vital Signs Last 24 Hrs  T(C): 36.2 (06-30-21 @ 07:53), Max: 37.2 (06-29-21 @ 22:25)  T(F): 97.1 (06-30-21 @ 07:53), Max: 98.9 (06-29-21 @ 22:25)  HR: 51 (06-30-21 @ 07:53) (51 - 72)  BP: 102/84 (06-30-21 @ 07:53) (102/84 - 114/76)  RR: 16 (06-30-21 @ 07:53) (16 - 19)  SpO2: 94% (06-30-21 @ 07:53) (94% - 97%)  Wt(kg): --      PHYSICAL EXAM:  Constitutional: NAD, awake and alert, well-developed  HEENT: PERR, EOMI  Neck: Soft and supple,  No JVD  Respiratory: few rhonchi; on NC   Cardiovascular: S1 and S2, regular rate and rhythm, no Murmurs  Gastrointestinal: Bowel Sounds present, soft, nontender, nondistended  Extremities: No peripheral edema  Vascular: 2+ peripheral pulses  Neurological: A/O x 3, no focal deficits  Musculoskeletal: 5/5 strength b/l upper and lower extremities  Skin: No rashes    Assessment and Plan:   · Assessment    Community Acquired Pneumonia  -s/p   IV Ceftriaxone and ZIthromax--> IV zosyn --> ceftin 500 bid 5 days  - CT chest rev by me - shows b/l diffuse infiltrates, discussed with Pulm  - started on IV steroids, cannot r/o underlying lung disease such as ILD --> po taper   home O2 eval  - no need O2   robitussin, incentive spirometry  Suspected  COPD  her last PFTs were 1.5 yrs ago and pt reports she did not have COPD then   but is on advair at home, current smoker   rec see her pulm on follow-up for repeat PFTs   c/w IV steroids --> po steroids  Tobacco abuse  -advised to quit smoking  -on Nicotine patch   ETOH Abuse  per discussion with patient's , she drinks 1-2 bottles of wine every night  will place on CIWA protocol  - not scoring    scoring low on CIWA at this time , will stop CIWA monitoring   Depression  on Venlafaxine   Psychiatry consult given depression and etoh abuse \par6/26 - rec taper xanax as OP; pt to speak to her psychiatrist about etoh use   pt seen by behavioral health team here   Insomnia - melatonin  VTE Px - SCDs, HEP SC     Code status: Patient is Full code    Disposition - medically optimized to be discharged home with close follow up with PCP within 1 week and pulmonologist   complete antibiotics  return to ED if fever, abdominal pain, nausea, vomiting, chest pain, dyspnea  Discharge plan discussed with patient, RN  Patient advised to follow up with PCP within 3-7 days  time spend 40 min  no PCP

## 2021-06-30 NOTE — PROGRESS NOTE ADULT - REASON FOR ADMISSION
Shortness of breath and cough

## 2021-06-30 NOTE — PROGRESS NOTE BEHAVIORAL HEALTH - NSBHFUPINTERVALHXFT_PSY_A_CORE
Patient is alert and oriented to person, time, place and situation. Reports mood being stable, denying acute symptoms. Denies acute needs.

## 2021-06-30 NOTE — DISCHARGE NOTE PROVIDER - PROVIDER TOKENS
PROVIDER:[TOKEN:[[id number]],FOLLOWUP:[1 week]],FREE:[LAST:[PCP],PHONE:[(   )    -],FAX:[(   )    -],FOLLOWUP:[1 week]]

## 2021-06-30 NOTE — PROGRESS NOTE ADULT - ASSESSMENT
PROBLEMS;    Hypoxaemic respiratory failure  Community Acquired Pneumonia-b/l pneumonia  COPD  Tobacco abuse  ETOH Abuse  Depression    PLAN:    pulmonary better & improving decd today  po ceftin / po prednisone  AEROSOLS  Tobacco abuse/advised to quit smoking/Nicotine patch   ETOH Abuse-WA protocol  SCD for DVT ppx

## 2021-07-02 LAB
CULTURE RESULTS: SIGNIFICANT CHANGE UP
SPECIMEN SOURCE: SIGNIFICANT CHANGE UP

## 2021-07-06 DIAGNOSIS — G47.00 INSOMNIA, UNSPECIFIED: ICD-10-CM

## 2021-07-06 DIAGNOSIS — J96.01 ACUTE RESPIRATORY FAILURE WITH HYPOXIA: ICD-10-CM

## 2021-07-06 DIAGNOSIS — F10.10 ALCOHOL ABUSE, UNCOMPLICATED: ICD-10-CM

## 2021-07-06 DIAGNOSIS — J18.9 PNEUMONIA, UNSPECIFIED ORGANISM: ICD-10-CM

## 2021-07-06 DIAGNOSIS — I10 ESSENTIAL (PRIMARY) HYPERTENSION: ICD-10-CM

## 2021-07-06 DIAGNOSIS — J44.9 CHRONIC OBSTRUCTIVE PULMONARY DISEASE, UNSPECIFIED: ICD-10-CM

## 2021-07-06 DIAGNOSIS — F32.9 MAJOR DEPRESSIVE DISORDER, SINGLE EPISODE, UNSPECIFIED: ICD-10-CM

## 2021-07-06 DIAGNOSIS — F17.210 NICOTINE DEPENDENCE, CIGARETTES, UNCOMPLICATED: ICD-10-CM

## 2022-01-14 NOTE — PATIENT PROFILE ADULT - NS PRO AD PATIENT TYPE
Pt. demonstrated poor balance in standing and was unsafe to ambulate with RW at this time due to high fall risk/unable to perform
Health Care Proxy (HCP)

## 2022-01-20 NOTE — ED ADULT NURSE NOTE - MODE OF DISCHARGE
Rina Herron is a 60 y.o. female who presents for Type 2 diabetes mellitus. Current symptoms/problems include Leg weakness and are unchanged. 1.  Type 2 diabetes, controlled, with neuropathy ([address] [E11.40]    Diagnosed with Type 2 diabetes mellitus in 2004.     Comorbid conditions: Neuropathy  Medtronic MiniMed insulin pump 630G    Current diabetic medications include: Humalog    Intolerance to diabetes medications: Yes Metformin    Patient is having a lot of stress, her  is ill. Despite that, she manages her diabetes very well. She got severe muscle weakness in her legs  Difficult to walk, worse  Stopped working    Weight trend: stable  Prior visit with dietician: yes  Current diet: on average, 3 meals per day  Current exercise: frequent     Current monitoring regimen: home blood tests - 10 times daily  Has brought blood glucose log/meter:  Yes  Home blood sugar records: fasting range:  and postprandial range:   Any episodes of hypoglycemia? Yes  Hypoglycemia frequency and time(s):  2 times a week  Does patient have Glucagon emergency kit? No  Does patient have rapid acting carbohydrate? Yes  Does patient wear a medic alert bracelet or necklace? No    2. Mixed hyperlipidemia  Has muscle pain. Had problems with lipitor and crestor. 3. Hashimoto's thyroiditis  Has fatigue. 4. Goiter  No difficulty swallowing. 5. Vitamin D deficiency  Has fatigue. 6. Essential hypertension  No headaches. 7. Obesity  Eats healthy. Exercises. Tried low carb diet, eats healthy, unsuccessful with weight loss. 8. Hypothyroidism  Has fatigue    9. MCTD  Sees Rheumatologist  Has point pain, joint swelling, fatigue    10.   Microalbuminuria  No urination problems      Narrative   THYROID ULTRASOUND, 3/4/2021 10:37 AM       HISTORY:      Reason for Exam:  Nontoxic goiter, unspecified.   E04.9:    Nontoxic goiter, unspecified     .        COMPARISON: 8/16/2017       TECHNIQUE: Grayscale and limited color Doppler imaging.       FINDINGS:       RIGHT LOBE:  4.8 x 1.3 x 1.2 cm       LEFT LOBE:    4.8 x 1.8 x 1 cm       ISTHMUS:     0.2 cm       There is no cervical adenopathy.                       [IMPRESSION]       1.  Normal thyroid. No nodule.  No change       Electronically Signed by: Yusef Waggoner MD, 3/4/2021 3:03 PM         Lab Results   Component Value Date    LABA1C 6.2 (H) 01/11/2022     No results found for: EAG        Past Medical History:   Diagnosis Date    Asthma     Congenital connective tissue disorder     Depression     Fibromyalgia     HLP (hyperkeratosis lenticularis perstans)     Hypertension     Muscle weakness     Neuropathy     Type 2 diabetes mellitus without complication (HCC)       Patient Active Problem List   Diagnosis    Type 2 diabetes, controlled, with neuropathy ([address]    Mixed hyperlipidemia    Hashimoto's thyroiditis    Goiter    Vitamin D deficiency    Essential hypertension    Class 2 obesity with body mass index (BMI) of 35.0 to 35.9 in adult    Acquired hypothyroidism    Acute hepatitis    Asthma    Atypical endometrial cells on Pap smear    Dyslipidemia    Fibromyalgia    Gastroesophageal reflux disease without esophagitis    Other chest pain    Postmenopausal bleeding    Type 2 diabetes mellitus with diabetic polyneuropathy (HCC)    MCTD (mixed connective tissue disease) (HCC)    Microalbuminuria     Past Surgical History:   Procedure Laterality Date    COLONOSCOPY      DILATION AND CURETTAGE OF UTERUS      ERCP      URETER STENT PLACEMENT       Social History     Socioeconomic History    Marital status:      Spouse name: Not on file    Number of children: Not on file    Years of education: Not on file    Highest education level: Not on file   Occupational History    Not on file   Tobacco Use    Smoking status: Former Smoker     Packs/day: 1.00     Years: 20.00     Pack years: 20.00     Types: Cigarettes     Quit date: 2010     Years since quittin.3    Smokeless tobacco: Never Used    Tobacco comment: quit in    Vaping Use    Vaping Use: Never used   Substance and Sexual Activity    Alcohol use: Not Currently     Alcohol/week: 2.0 standard drinks     Types: 2 Glasses of wine per week    Drug use: No    Sexual activity: Yes     Partners: Male   Other Topics Concern    Not on file   Social History Narrative    Not on file     Social Determinants of Health     Financial Resource Strain:     Difficulty of Paying Living Expenses: Not on file   Food Insecurity:     Worried About Running Out of Food in the Last Year: Not on file    Dedra of Food in the Last Year: Not on file   Transportation Needs:     Lack of Transportation (Medical): Not on file    Lack of Transportation (Non-Medical):  Not on file   Physical Activity:     Days of Exercise per Week: Not on file    Minutes of Exercise per Session: Not on file   Stress:     Feeling of Stress : Not on file   Social Connections:     Frequency of Communication with Friends and Family: Not on file    Frequency of Social Gatherings with Friends and Family: Not on file    Attends [employer] Services: Not on file    Active Member of Clubs or Organizations: Not on file    Attends Club or Organization Meetings: Not on file    Marital Status: Not on file   Intimate Partner Violence:     Fear of Current or Ex-Partner: Not on file    Emotionally Abused: Not on file    Physically Abused: Not on file    Sexually Abused: Not on file   Housing Stability:     Unable to Pay for Housing in the Last Year: Not on file    Number of [place] in the Last Year: Not on file    Unstable Housing in the Last Year: Not on file     Family History   Problem Relation Age of Onset    Heart Disease Mother     Diabetes Mother     Other Father 66        Lymphoma    Diabetes Sister     Other Sister         HLP    High Blood Pressure Brother     No Known Problems Brother  Other Brother 52        Suicide    Asthma Daughter     Other Daughter         Chiari Malformation     Current Outpatient Medications   Medication Sig Dispense Refill    CONTOUR NEXT TEST strip Test 10 times daily 900 each 5    SYNTHROID 75 MCG tablet Take 1 tablet by mouth every morning (before breakfast) 90 tablet 1    ESTRACE VAGINAL 0.1 MG/GM vaginal cream       mirabegron (MYRBETRIQ) 50 MG TB24 Take 50 mg by mouth daily      simvastatin (ZOCOR) 20 MG tablet Take 1 tablet by mouth every evening 90 tablet 1    ezetimibe (ZETIA) 10 MG tablet Take 1 tablet by mouth daily 90 tablet 1    insulin aspart (NOVOLOG) 100 UNIT/ML injection vial In insulin pump, total daily dose 150 units 15 vial 1    fluticasone (CUTIVATE) 0.05 % cream       clobetasol (TEMOVATE) 0.05 % cream as needed       omeprazole (PRILOSEC) 20 MG delayed release capsule Daily       fluticasone propionate (CUTIVATE) 0.05 % LOTN lotion APPLY TO THE ITCHY RASH ON THE LOWER LEGS AND THIGHS DAILY TILL CLEAR**[place] [id number] HAS IN STOCK**  3    Cholecalciferol (VITAMIN D) 2000 units CAPS capsule Take 1 tablet alternating with 2 tablets every other day 30 capsule 0    nortriptyline (PAMELOR) 10 MG capsule Take 40 mg by mouth nightly       gabapentin (NEURONTIN) 600 MG tablet Take 600 mg by mouth three times daily.  traMADol (ULTRAM) 50 MG tablet Take 50 mg by mouth as needed.  lisinopril-hydrochlorothiazide (PRINZIDE;ZESTORETIC) 20-25 MG per tablet Take 1 tablet by mouth daily      MULTIPLE VITAMIN PO Take 1 tablet by mouth daily      hydroxychloroquine (PLAQUENIL) 200 MG tablet Take 200 mg by mouth 2 times daily      Inulin (FIBER CHOICE PO) Take by mouth daily       triamcinolone (KENALOG) 0.1 % cream Apply topically 2 times daily Apply topically 2 times daily.  doxepin (SINEQUAN) 10 MG capsule daily  (Patient not taking: Reported on 1/20/2022)       No current facility-administered medications for this visit. Allergies   Allergen Reactions    Sulfa Antibiotics Hives    Hydrocodone-Acetaminophen Nausea And Vomiting     Family Status   Relation Name Status    Mother      Father     [facility] Sister  (Not Specified)    Brother  Alive    Brother  Alive    Brother      Suzan  Alive       Review of Systems  Constitutional: has fatigue, no fever, no recent weight gain, no recent weight loss, no changes in appetite  Eyes: no eye pain, no change in vision, no eye redness, no eye irritation, no double vision  Ears, nose, throat: no nasal congestion, no sore throat, no earache, no decrease in hearing, no hoarseness, no dry mouth, no sinus problems, no difficulty swallowing, no neck lumps, no dental problems, no mouth sores, no ringing in ears  Pulmonary: no shortness of breath, no wheezing, no dyspnea on exertion, no cough  Cardiovascular: no chest pain, no lower extremity edema, no orthopnea, no intermittent leg claudication, no palpitations  Gastrointestinal: no abdominal pain, no nausea, no vomiting, no diarrhea, no constipation, no dysphagia, no heartburn, no bloating  Genitourinary: no dysuria, no urinary incontinence, no urinary hesitancy, no urinary frequency, no feelings of urinary urgency, no nocturia  Musculoskeletal: has joint swelling, has joint stiffness, has joint pain, no muscle cramps, has muscle pain  Integument/Breast: no hair loss, no skin rashes, no skin lesions, no itching, no dry skin  Neurological: no numbness, no tingling, no weakness, no confusion, no headaches, no dizziness, no fainting, no tremors, no decrease in memory, no balance problems  Psychiatric: no anxiety, no depression, no insomnia  Hematologic/Lymphatic: no tendency for easy bleeding, no swollen lymph nodes, no tendency for easy bruising  Immunology: no seasonal allergies, no frequent infections, no frequent illnesses  Endocrine: has temperature intolerance, no hirsutism, has hot flashes    OBJECTIVE:  /72 Pulse 74   Temp 98 °F (36.7 °C)   Resp 14   Ht 5' 8\" (1.727 m)   Wt 242 lb (109.8 kg)   SpO2 98%   BMI 36.80 kg/m²      Wt Readings from Last 3 Encounters:   01/20/22 242 lb (109.8 kg)   09/30/21 237 lb 6.4 oz (107.7 kg)   06/22/21 235 lb (106.6 kg)         Constitutional: no acute distress, well appearing and well nourished  Psychiatric: oriented to person, place and time, judgement and insight and normal, recent and remote memory and intact and mood and affect are normal  Skin: skin and subcutaneous tissue is normal without mass, normal turgor  Head and Face: examination of head and face revealed no abnormalities  Eyes: no lid or conjunctival swelling, erythema or discharge, pupils are normal, equal, round, reactive to light  Ears/Nose: external inspection of ears and nose revealed no abnormalities, hearing is grossly normal  Oropharynx/Mouth/Face: lips, tongue and gums are normal with no lesions, the voice quality was normal  Neck: neck is supple and symmetric, with midline trachea and no masses, thyroid is normal  Lymphatics: normal cervical lymph nodes, normal supraclavicular nodes  Pulmonary: no increased work of breathing or signs of respiratory distress, lungs are clear to auscultation  Cardiovascular: normal heart rate and rhythm, normal S1 and S2, no murmurs and pedal pulses and 2+ bilaterally, No edema  Abdomen: abdomen is soft, non-tender with no masses  Musculoskeletal: normal gait and station and exam of the digits and nails are normal  Neurological: normal coordination and normal general cortical function    Visual inspection:  Deformity/amputation: has hammer toes, no amputation  Skin lesions/pre-ulcerative calluses: absent  Edema: right- negative, left- negative     Sensory exam:  Monofilament sensation: normal  (minimum of 5 random plantar locations tested, avoiding callused areas - > 1 area with absence of sensation is + for neuropathy)     Plus at least one of the following:  Pulses: normal Proprioception: Intact  Vibration (128 Hz): Impaired     ASSESSMENT/PLAN:  1. Type 2 diabetes, controlled, with neuropathy (HCC)  Insulin pump tracing shows very appropriate diabetes control, no significant hyper or hypoglycemia. Recommend to continue current rates and ratios  Basal amount 55%, Bolus amount 45%  HbA1c 9.3-9.2-3.2-6.5-7.9-5.3-3.1-8.4-7.3-9.3  Stopped Trulicity due to side effects, diarrhea  Test 10 times daily. - insulin lispro (HUMALOG) 100 UNIT/ML injection vial; In insulin pump, total daily dose 150 units    - Hemoglobin A1C; Future  - Comprehensive Metabolic Panel; Future    2. Mixed hyperlipidemia  LDL 20-28-79-53-51-11-47-74-68-64  - simvastatin  - ezetimibe (ZETIA) 10 MG tablet; Take 1 tablet by mouth daily    - Lipid Panel; Future    3. Hashimoto's thyroiditis  Follow  TSH 2.1-3.3-2.3-1.4-2.7-2.58-2.21    4. Goiter  Thyroid sono  TSH 2.7-2.58-2.21    5. Vitamin D deficiency  25 hydroxy vitamin D 44-67-42-40-10-22-47-54-41  On 3000 IU daily. Supplements. - Cholecalciferol (VITAMIN D) 3000 units CAPS capsule; Take by mouth, decrease to 2000 IU daily for summer  - Vitamin D 25 Hydroxy; Future    6. Essential hypertension  - lisinopril-hydrochlorothiazide (PRINZIDE;ZESTORETIC) 20-25 MG per tablet; Take 1 tablet by mouth daily    7. Obesity  Diet, exercise  History of weight:  02/06/20 240 lb (108.9 kg)   11/07/19 233 lb (105.7 kg)   08/02/19 233 lb (105.7 kg)     05/03/19 232 lb 9.6 oz (105.5 kg)     02/01/19 229 lb 6.4 oz (104.1 kg)     Phentermine 7/30/2018-10/30/2018  Weight 229 lbs 2/1/2019  Weight 237 lbs 7/30/2018. Weight 231 lbs 8/29/2018  Weight 230 lbs 12.8 oz on 9/25/2018  Weight 230 lbs on 10/31/2018    8. Acquired hypothyroidism  TSH 0.9-1.3-2.1-3.3-2.3-2.3-2.77-2.58  Continue Synthroid 0.075 mg qd  - T4, Free; Future  - TSH without Reflex; Future    9. MCTD  Continue Rheumatology follow up.     10.  Microalbuminuria  Repeated microalbumin creatinine ratio normal  On lisinopril      Reviewed and/or ordered clinical lab results Yes  Reviewed and/or ordered radiology tests Yes  Reviewed and/or ordered other diagnostic tests No  Discussed test results with performing physician No  Independently reviewed image, tracing, or specimen Yes Insulin pump data, settings, tracing, total 7 pages. See scanned report. Continue same rates and ratios. Average blood glucose 119±21  Made a decision to obtain old records No  Reviewed old records Yes  Obtained history from other than patient No    Deland Severin was counseled regarding symptoms of diabetes diagnosis, hypothyroidism course and complications of disease if inadequately treated, side effects of medications, diagnosis, treatment options, and prognosis, risks, benefits, complications, and alternatives of treatment, labs, imaging and other studies and treatment targets and goals, insulin pump records, settings  She understands instructions and counseling    Total time I spent for this encounter 40 minutes    Return in about 3 months (around 4/20/2022) for diabetes. Ambulatory

## 2022-04-12 NOTE — H&P ADULT - NSICDXFAMILYHX_GEN_ALL_CORE_FT
"Subjective   Patient is an 83-year-old white female with history of hypertension and CAD with prior stents most recently in February 2022 at [facility].  She presents today from home accompanied by her son with complaints of shortness of breath.  She states she had some mild shortness of breath for the last 2 to 3 weeks but states that it has significantly worsened over the last couple of days.  Complains of shortness of breath at rest that is worse with exertion.  She states her primary care provider started her on valsartan couple of weeks ago but states she is had no improvement with this and now worsening over the last couple of days.  She states she feels like it is her heart as she has not felt well since stent placement in February.  She denies any associated chest pain pressure or tightness.  She denies any fever chills cough or congestion.  She denies abdominal pain nausea vomiting diarrhea black or bloody stools.  She states at times she does feel like her legs are swelling but none today.  She denies any calf pain, recent travel or prolonged immobilization.          Review of Systems   Constitutional: Negative for chills and fever.   HENT: Negative for congestion.    Eyes: Negative for visual disturbance.   Respiratory: Positive for shortness of breath. Negative for cough.    Cardiovascular: Positive for leg swelling. Negative for chest pain and palpitations.   Gastrointestinal: Negative for abdominal pain, blood in stool, diarrhea, nausea and vomiting.   Genitourinary: Negative for decreased urine volume and dysuria.   Musculoskeletal: Negative for back pain and neck pain.   Skin: Negative for rash.   Neurological: Negative for dizziness, weakness, light-headedness and headaches.       Past Medical History:   Diagnosis Date   • Hypertension        Allergies   Allergen Reactions   • Aspirin Other (See Comments)     Pt reports \"makes my heart go crazy\"   • Codeine GI Intolerance   • " Penicillins Hives       Past Surgical History:   Procedure Laterality Date   • AORTA SURGERY     • BACK SURGERY     • CORONARY ANGIOPLASTY WITH STENT PLACEMENT  01/06/2022       History reviewed. No pertinent family history.    Social History     Socioeconomic History   • Marital status:    Tobacco Use   • Smoking status: Former Smoker   • Smokeless tobacco: Never Used   Substance and Sexual Activity   • Drug use: Never   • Sexual activity: Defer           Objective   Physical Exam  Vital signs and triage nurse note reviewed.  Constitutional: Awake, alert; well-developed and well-nourished. No acute distress is noted.  HEENT: Normocephalic, atraumatic; pupils are PERRL with intact EOM; oropharynx is pink and moist without exudate or erythema.  No drooling or pooling of oral secretions.  Neck: Supple, full range of motion without pain; no cervical lymphadenopathy. Normal phonation.  Cardiovascular: Mildly bradycardic rate and rhythm, normal S1-S2.  No murmur noted.  Pulmonary: Respiratory effort regular nonlabored, breath sounds clear to auscultation all fields.  Abdomen: Soft, nontender, nondistended with normoactive bowel sounds; no rebound or guarding.  Musculoskeletal: Independent range of motion of all extremities with no palpable tenderness or edema.  Neuro: Alert oriented x3, speech is clear and appropriate, GCS 15.    Skin: Flesh tone, warm, dry, intact; no erythematous or petechial rash or lesion.      Procedures           ED Course      Labs Reviewed   COMPREHENSIVE METABOLIC PANEL - Abnormal; Notable for the following components:       Result Value    BUN/Creatinine Ratio 27.5 (*)     All other components within normal limits    Narrative:     GFR Normal >60  Chronic Kidney Disease <60  Kidney Failure <15     CBC WITH AUTO DIFFERENTIAL - Abnormal; Notable for the following components:    WBC 2.80 (*)     RBC 3.45 (*)     Hemoglobin 11.4 (*)     Hematocrit 33.0 (*)     MPV 12.2 (*)     Platelets 64  (*)     Neutrophil % 35.3 (*)     Lymphocyte % 50.0 (*)     Monocyte % 14.2 (*)     Eosinophil % 0.2 (*)     Neutrophils, Absolute 1.00 (*)     All other components within normal limits   PROTIME-INR - Normal   APTT - Normal   BNP (IN-HOUSE) - Normal    Narrative:     Among patients with dyspnea, NT-proBNP is highly sensitive for the detection of acute congestive heart failure. In addition NT-proBNP of <300 pg/ml effectively rules out acute congestive heart failure with 99% negative predictive value.    Results may be falsely decreased if patient taking Biotin.     TROPONIN (IN-HOUSE) - Normal    Narrative:     Troponin T Reference Range:  <= 0.03 ng/mL-   Negative for AMI  >0.03 ng/mL-     Abnormal for myocardial necrosis.  Clinicians would have to utilize clinical acumen, EKG, Troponin and serial changes to determine if it is an Acute Myocardial Infarction or myocardial injury due to an underlying chronic condition.       Results may be falsely decreased if patient taking Biotin.     SCAN SLIDE   CBC AND DIFFERENTIAL    Narrative:     The following orders were created for panel order CBC & Differential.  Procedure                               Abnormality         Status                     ---------                               -----------         ------                     CBC Auto Differential[[id number]]        Abnormal            Final result               Scan Slide[[id number]]                                       Final result                 Please view results for these tests on the individual orders.     XR Chest 1 View    Result Date: 4/12/2022  No acute process.  Electronically Signed By-Aditya Fraser MD On:4/12/2022 11:16 AM This report was finalized on [id number] by  Aditya Fraser MD.    Medications   sodium chloride 0.9 % flush 10 mL (has no administration in time range)                                                MDM  Number of Diagnoses or Management Options  Shortness of breath  Diagnosis  management comments: Comorbidities: Hypertension, CAD with stents  Differentials: Heart failure, cardiac ischemia, ACS, pneumonia;this list is not all inclusive and does not constitute the entirety of considered causes  Discussion with provider:  Radiology interpretation: X-rays reviewed by me and interpreted by radiologist: As above  Lab interpretation: Labs viewed by me significant for: As above    Patient was placed on continuous cardiac monitor.  She had IV established.  She had labs, EKG chest x-ray obtained.    Work-up: EKG reviewed by me and interpreted by Dr. Lee shows sinus bradycardia with ventricular rate of 57, no acute ST or T wave changes, no ectopy.  CBC significant for WBC of 2.8, hemoglobin 11.4, platelet count 63.  Metabolic panel grossly unremarkable.  Negative troponin.  proBNP within normal limits.  Chest x-ray shows no acute abnormality.    Medical records from patient's most [facility] to [facility] were obtained and reviewed.      She will be placed in observation unit for further cardiac monitoring and evaluation as warranted.    Diagnosis and treatment plan discussed with patient.  Patient agreeable to plan.          Amount and/or Complexity of Data Reviewed  Clinical lab tests: ordered and reviewed  Tests in the radiology section of CPT®: ordered and reviewed    Patient Progress  Patient progress: stable      Final diagnoses:   Shortness of breath       ED Disposition  ED Disposition     ED Disposition   Decision to Admit    Condition   --    Comment   --             No follow-up provider specified.       Medication List      No changes were made to your prescriptions during this visit.          Rasheeda Haque, [facility]  04/12/22 [id number]     No pertinent family history in first degree relatives

## 2023-08-25 NOTE — ED ADULT TRIAGE NOTE - BP NONINVASIVE DIASTOLIC (MM HG)
Speech Therapy    Patient not seen in therapy today.    Additional details:  NIH is currently 0 and patient passed RN swallow screen.  Please re-consult if neuro status changes.      OBJECTIVE                          Therapy procedure time and total treatment time can be found documented on the Time Entry flowsheet   74
